# Patient Record
Sex: FEMALE | Race: WHITE | NOT HISPANIC OR LATINO | Employment: UNEMPLOYED | URBAN - METROPOLITAN AREA
[De-identification: names, ages, dates, MRNs, and addresses within clinical notes are randomized per-mention and may not be internally consistent; named-entity substitution may affect disease eponyms.]

---

## 2017-04-07 ENCOUNTER — HISTORICAL (OUTPATIENT)
Dept: HEPATOLOGY | Facility: HOSPITAL | Age: 67
End: 2017-04-07

## 2017-04-13 ENCOUNTER — HISTORICAL (OUTPATIENT)
Dept: ADMINISTRATIVE | Facility: HOSPITAL | Age: 67
End: 2017-04-13

## 2017-05-04 ENCOUNTER — HISTORICAL (OUTPATIENT)
Dept: LAB | Facility: HOSPITAL | Age: 67
End: 2017-05-04

## 2017-05-04 LAB
ABS NEUT (OLG): 3 X10(3)/MCL (ref 2.1–9.2)
ALBUMIN SERPL-MCNC: 3.8 GM/DL (ref 3.4–5)
ALBUMIN/GLOB SERPL: 1.1 {RATIO}
ALP SERPL-CCNC: 66 UNIT/L (ref 38–126)
ALT SERPL-CCNC: 15 UNIT/L (ref 12–78)
APTT PPP: 30 SECOND(S) (ref 20.6–36)
AST SERPL-CCNC: 17 UNIT/L (ref 15–37)
BASOPHILS # BLD AUTO: 0 X10(3)/MCL (ref 0–0.2)
BASOPHILS NFR BLD AUTO: 1 %
BILIRUB SERPL-MCNC: 0.5 MG/DL (ref 0.2–1)
BILIRUBIN DIRECT+TOT PNL SERPL-MCNC: 0.1 MG/DL (ref 0–0.2)
BILIRUBIN DIRECT+TOT PNL SERPL-MCNC: 0.4 MG/DL (ref 0–0.8)
BUN SERPL-MCNC: 17 MG/DL (ref 7–18)
CALCIUM SERPL-MCNC: 8.8 MG/DL (ref 8.5–10.1)
CHLORIDE SERPL-SCNC: 109 MMOL/L (ref 98–107)
CO2 SERPL-SCNC: 24 MMOL/L (ref 21–32)
CREAT SERPL-MCNC: 0.72 MG/DL (ref 0.55–1.02)
EOSINOPHIL # BLD AUTO: 0.1 X10(3)/MCL (ref 0–0.9)
EOSINOPHIL NFR BLD AUTO: 2 %
ERYTHROCYTE [DISTWIDTH] IN BLOOD BY AUTOMATED COUNT: 14.6 % (ref 11.5–17)
GLOBULIN SER-MCNC: 3.5 GM/DL (ref 2.4–3.5)
GLUCOSE SERPL-MCNC: 97 MG/DL (ref 74–106)
GROUP & RH: NORMAL
HCT VFR BLD AUTO: 42.9 % (ref 37–47)
HGB BLD-MCNC: 13.6 GM/DL (ref 12–16)
INR PPP: 0.96 (ref 0–1.27)
LYMPHOCYTES # BLD AUTO: 2 X10(3)/MCL (ref 0.6–4.6)
LYMPHOCYTES NFR BLD AUTO: 35 %
MAGNESIUM SERPL-MCNC: 2.2 MG/DL (ref 1.8–2.4)
MCH RBC QN AUTO: 28.7 PG (ref 27–31)
MCHC RBC AUTO-ENTMCNC: 31.7 GM/DL (ref 33–36)
MCV RBC AUTO: 90.5 FL (ref 80–94)
MONOCYTES # BLD AUTO: 0.5 X10(3)/MCL (ref 0.1–1.3)
MONOCYTES NFR BLD AUTO: 8 %
NEUTROPHILS # BLD AUTO: 3 X10(3)/MCL (ref 2.1–9.2)
NEUTROPHILS NFR BLD AUTO: 53 %
PHOSPHATE SERPL-MCNC: 3.1 MG/DL (ref 2.5–4.9)
PLATELET # BLD AUTO: 280 X10(3)/MCL (ref 130–400)
PMV BLD AUTO: 12.5 FL (ref 9.4–12.4)
POTASSIUM SERPL-SCNC: 4.6 MMOL/L (ref 3.5–5.1)
PROT SERPL-MCNC: 7.3 GM/DL (ref 6.4–8.2)
PROTHROMBIN TIME: 12.6 SECOND(S) (ref 12.1–14.2)
RBC # BLD AUTO: 4.74 X10(6)/MCL (ref 4.2–5.4)
SODIUM SERPL-SCNC: 142 MMOL/L (ref 136–145)
WBC # SPEC AUTO: 5.6 X10(3)/MCL (ref 4.5–11.5)

## 2017-06-08 ENCOUNTER — HISTORICAL (OUTPATIENT)
Dept: ADMINISTRATIVE | Facility: HOSPITAL | Age: 67
End: 2017-06-08

## 2017-06-08 LAB
APPEARANCE, UA: CLEAR
BACTERIA SPEC CULT: ABNORMAL /HPF
BILIRUB UR QL STRIP: NEGATIVE
COLOR UR: ABNORMAL
GLUCOSE (UA): NEGATIVE
HGB UR QL STRIP: ABNORMAL
KETONES UR QL STRIP: NEGATIVE
LEUKOCYTE ESTERASE UR QL STRIP: ABNORMAL
NITRITE UR QL STRIP: NEGATIVE
PH UR STRIP: 5 [PH] (ref 5–9)
PROT UR QL STRIP: NEGATIVE
RBC #/AREA URNS HPF: ABNORMAL /[HPF]
SP GR UR STRIP: 1.02 (ref 1–1.03)
SQUAMOUS EPITHELIAL, UA: ABNORMAL
UROBILINOGEN UR STRIP-ACNC: 0.2
WBC #/AREA URNS HPF: 22 /HPF (ref 0–3)

## 2017-09-12 ENCOUNTER — HISTORICAL (OUTPATIENT)
Dept: LAB | Facility: HOSPITAL | Age: 67
End: 2017-09-12

## 2017-09-12 LAB
ABS NEUT (OLG): 2.21
ALBUMIN SERPL-MCNC: 3.2 GM/DL (ref 3.4–5)
ALBUMIN/GLOB SERPL: 0.8 RATIO (ref 1.1–2)
ALP SERPL-CCNC: 75 UNIT/L (ref 50–136)
ALT SERPL-CCNC: 18 UNIT/L (ref 12–78)
AST SERPL-CCNC: 15 UNIT/L (ref 10–37)
BASOPHILS # BLD AUTO: 0.05 X10(3)/MCL
BASOPHILS NFR BLD AUTO: 1 %
BILIRUB SERPL-MCNC: 0.4 MG/DL (ref 0.2–1)
BILIRUBIN DIRECT+TOT PNL SERPL-MCNC: 0.06 MG/DL (ref 0.05–0.2)
BILIRUBIN DIRECT+TOT PNL SERPL-MCNC: 0.34 MG/DL
BUN SERPL-MCNC: 11 MG/DL (ref 7–18)
CALCIUM SERPL-MCNC: 8.7 MG/DL (ref 8.5–10.1)
CHLORIDE SERPL-SCNC: 107 MMOL/L (ref 98–107)
CHOLEST SERPL-MCNC: 232 MG/DL (ref 50–200)
CHOLEST/HDLC SERPL: 4 {RATIO} (ref 0–5)
CO2 SERPL-SCNC: 26.1 MMOL/L (ref 21–32)
CREAT SERPL-MCNC: 0.75 MG/DL (ref 0.55–1.02)
DEPRECATED CALCIDIOL+CALCIFEROL SERPL-MC: 40.38 NG/ML (ref 30–80)
EOSINOPHIL # BLD AUTO: 0.2 X10(3)/MCL
EOSINOPHIL NFR BLD AUTO: 4 %
ERYTHROCYTE [DISTWIDTH] IN BLOOD BY AUTOMATED COUNT: 15 %
GLOBULIN SER-MCNC: 3.8 GM/DL (ref 2.4–3.5)
GLUCOSE SERPL-MCNC: 105 MG/DL (ref 74–106)
HCT VFR BLD AUTO: 39.3 % (ref 34–46)
HDLC SERPL-MCNC: 58 MG/DL (ref 35–60)
HGB BLD-MCNC: 12.3 GM/DL (ref 11.3–15.4)
IMM GRANULOCYTES # BLD AUTO: 0.01 10*3/UL (ref 0–0.1)
IMM GRANULOCYTES NFR BLD AUTO: 0.2 % (ref 0–1)
LDLC SERPL CALC-MCNC: 145 MG/DL (ref 50–140)
LYMPHOCYTES # BLD AUTO: 2.12 X10(3)/MCL
LYMPHOCYTES NFR BLD AUTO: 41.9 %
MCH RBC QN AUTO: 28.7 PG (ref 27–33)
MCHC RBC AUTO-ENTMCNC: 31.3 GM/DL (ref 32–35)
MCV RBC AUTO: 91.6 FL (ref 81–97)
MONOCYTES # BLD AUTO: 0.47 X10(3)/MCL
MONOCYTES NFR BLD AUTO: 9.3 %
NEUTROPHILS # BLD AUTO: 2.21 X10(3)/MCL
NEUTROPHILS NFR BLD AUTO: 43.6 %
PLATELET # BLD AUTO: 323 X10(3)/MCL (ref 151–368)
PMV BLD AUTO: 11 FL
POTASSIUM SERPL-SCNC: 3.9 MMOL/L (ref 3.5–5.1)
PROT SERPL-MCNC: 7 GM/DL (ref 6.4–8.2)
RBC # BLD AUTO: 4.29 X10(6)/MCL (ref 3.9–5)
SODIUM SERPL-SCNC: 142 MMOL/L (ref 136–145)
TRIGL SERPL-MCNC: 144 MG/DL (ref 30–150)
TSH SERPL-ACNC: 4.38 MIU/ML (ref 0.35–3.75)
VLDLC SERPL CALC-MCNC: 29 MG/DL
WBC # SPEC AUTO: 5.06 X10(3)/MCL (ref 3.4–9.2)

## 2017-12-19 ENCOUNTER — HISTORICAL (OUTPATIENT)
Dept: LAB | Facility: HOSPITAL | Age: 67
End: 2017-12-19

## 2017-12-19 LAB — TSH SERPL-ACNC: 1.69 MIU/ML (ref 0.35–3.75)

## 2018-05-22 ENCOUNTER — HISTORICAL (OUTPATIENT)
Dept: RADIOLOGY | Facility: HOSPITAL | Age: 68
End: 2018-05-22

## 2019-10-04 ENCOUNTER — HISTORICAL (OUTPATIENT)
Dept: LAB | Facility: HOSPITAL | Age: 69
End: 2019-10-04

## 2019-10-04 LAB
ABS NEUT (OLG): 2.39
ALBUMIN SERPL-MCNC: 3.4 GM/DL (ref 3.4–5)
ALBUMIN/GLOB SERPL: 0.9 RATIO (ref 1.1–2)
ALP SERPL-CCNC: 71 UNIT/L (ref 46–116)
ALT SERPL-CCNC: 12 UNIT/L (ref 12–78)
AST SERPL-CCNC: 15 UNIT/L (ref 10–37)
BASOPHILS # BLD AUTO: 0.06 X10(3)/MCL
BASOPHILS NFR BLD AUTO: 1.2 %
BILIRUB SERPL-MCNC: 0.4 MG/DL (ref 0.2–1)
BILIRUBIN DIRECT+TOT PNL SERPL-MCNC: 0.09 MG/DL (ref 0–0.2)
BILIRUBIN DIRECT+TOT PNL SERPL-MCNC: 0.31 MG/DL
BUN SERPL-MCNC: 11 MG/DL (ref 7–18)
CALCIUM SERPL-MCNC: 8.8 MG/DL (ref 8.5–10.1)
CHLORIDE SERPL-SCNC: 107 MMOL/L (ref 98–107)
CHOLEST SERPL-MCNC: 260 MG/DL (ref 50–200)
CHOLEST/HDLC SERPL: 4 {RATIO} (ref 0–5)
CO2 SERPL-SCNC: 27.8 MMOL/L (ref 21–32)
CREAT SERPL-MCNC: 0.71 MG/DL (ref 0.55–1.02)
EOSINOPHIL # BLD AUTO: 0.17 X10(3)/MCL
EOSINOPHIL NFR BLD AUTO: 3.4 %
ERYTHROCYTE [DISTWIDTH] IN BLOOD BY AUTOMATED COUNT: 16 %
GLOBULIN SER-MCNC: 3.8 GM/DL (ref 2.4–3.5)
GLUCOSE SERPL-MCNC: 100 MG/DL (ref 74–106)
HCT VFR BLD AUTO: 42.4 % (ref 34–46)
HDLC SERPL-MCNC: 63 MG/DL (ref 35–60)
HGB BLD-MCNC: 13.4 GM/DL (ref 11.3–15.4)
IMM GRANULOCYTES # BLD AUTO: 0 10*3/UL (ref 0–0.1)
IMM GRANULOCYTES NFR BLD AUTO: 0 % (ref 0–1)
LDLC SERPL CALC-MCNC: 171 MG/DL (ref 50–140)
LYMPHOCYTES # BLD AUTO: 1.97 X10(3)/MCL
LYMPHOCYTES NFR BLD AUTO: 38.9 %
MCH RBC QN AUTO: 28.5 PG (ref 27–33)
MCHC RBC AUTO-ENTMCNC: 31.6 GM/DL (ref 32–35)
MCV RBC AUTO: 90 FL (ref 81–97)
MONOCYTES # BLD AUTO: 0.48 X10(3)/MCL
MONOCYTES NFR BLD AUTO: 9.5 %
NEUTROPHILS # BLD AUTO: 2.39 X10(3)/MCL
NEUTROPHILS NFR BLD AUTO: 47 %
PLATELET # BLD AUTO: 279 X10(3)/MCL (ref 140–450)
PMV BLD AUTO: 11 FL
POTASSIUM SERPL-SCNC: 4.2 MMOL/L (ref 3.5–5.1)
PROT SERPL-MCNC: 7.2 GM/DL (ref 6.4–8.2)
RBC # BLD AUTO: 4.71 X10(6)/MCL (ref 3.9–5)
SODIUM SERPL-SCNC: 140 MMOL/L (ref 136–145)
TRIGL SERPL-MCNC: 131 MG/DL (ref 30–150)
TSH SERPL-ACNC: 2.03 MIU/ML (ref 0.35–3.75)
VLDLC SERPL CALC-MCNC: 26 MG/DL
WBC # SPEC AUTO: 5.07 X10(3)/MCL (ref 3.4–9.2)

## 2019-10-07 ENCOUNTER — HISTORICAL (OUTPATIENT)
Dept: RADIOLOGY | Facility: HOSPITAL | Age: 69
End: 2019-10-07

## 2020-10-06 ENCOUNTER — HISTORICAL (OUTPATIENT)
Dept: LAB | Facility: HOSPITAL | Age: 70
End: 2020-10-06

## 2020-10-06 LAB
ABS NEUT (OLG): 2.72
ALBUMIN SERPL-MCNC: 3.7 GM/DL (ref 3.4–4.8)
ALBUMIN/GLOB SERPL: 1.1 RATIO (ref 1.1–2)
ALP SERPL-CCNC: 77 UNIT/L (ref 40–150)
ALT SERPL-CCNC: 15 UNIT/L (ref 0–55)
AST SERPL-CCNC: 17 UNIT/L (ref 5–34)
BASOPHILS # BLD AUTO: 0.04 X10(3)/MCL
BASOPHILS NFR BLD AUTO: 0.7 %
BILIRUB SERPL-MCNC: 0.6 MG/DL
BILIRUBIN DIRECT+TOT PNL SERPL-MCNC: 0.2 MG/DL (ref 0–0.5)
BILIRUBIN DIRECT+TOT PNL SERPL-MCNC: 0.4 MG/DL
BUN SERPL-MCNC: 15 MG/DL (ref 9.8–20.1)
CALCIUM SERPL-MCNC: 8.9 MG/DL (ref 8.4–10.2)
CHLORIDE SERPL-SCNC: 110 MMOL/L (ref 98–107)
CHOLEST SERPL-MCNC: 168 MG/DL
CHOLEST/HDLC SERPL: 3 {RATIO} (ref 0–5)
CO2 SERPL-SCNC: 25 MEQ/L (ref 23–31)
CREAT SERPL-MCNC: 0.78 MG/DL (ref 0.55–1.02)
DEPRECATED CALCIDIOL+CALCIFEROL SERPL-MC: 37.7 NG/ML (ref 6.6–49.9)
EOSINOPHIL # BLD AUTO: 0.16 X10(3)/MCL
EOSINOPHIL NFR BLD AUTO: 2.9 %
ERYTHROCYTE [DISTWIDTH] IN BLOOD BY AUTOMATED COUNT: 16 %
GLOBULIN SER-MCNC: 3.5 GM/DL (ref 2.4–3.5)
GLUCOSE SERPL-MCNC: 111 MG/DL (ref 82–115)
HCT VFR BLD AUTO: 43.6 % (ref 34–46)
HDLC SERPL-MCNC: 57 MG/DL (ref 35–60)
HGB BLD-MCNC: 13.1 GM/DL (ref 11.3–15.4)
IMM GRANULOCYTES # BLD AUTO: 0 10*3/UL (ref 0–0.1)
IMM GRANULOCYTES NFR BLD AUTO: 0 % (ref 0–1)
LDLC SERPL CALC-MCNC: 90 MG/DL (ref 50–140)
LYMPHOCYTES # BLD AUTO: 2.04 X10(3)/MCL
LYMPHOCYTES NFR BLD AUTO: 36.8 %
MCH RBC QN AUTO: 27.9 PG (ref 27–33)
MCHC RBC AUTO-ENTMCNC: 30 GM/DL (ref 32–35)
MCV RBC AUTO: 93 FL (ref 81–97)
MONOCYTES # BLD AUTO: 0.58 X10(3)/MCL
MONOCYTES NFR BLD AUTO: 10.5 %
NEUTROPHILS # BLD AUTO: 2.72 X10(3)/MCL
NEUTROPHILS NFR BLD AUTO: 49.1 %
PLATELET # BLD AUTO: 274 X10(3)/MCL (ref 140–450)
PMV BLD AUTO: 11 FL
POTASSIUM SERPL-SCNC: 4.3 MMOL/L (ref 3.5–5.1)
PROT SERPL-MCNC: 7.2 GM/DL (ref 5.8–7.6)
RBC # BLD AUTO: 4.69 X10(6)/MCL (ref 3.9–5)
SODIUM SERPL-SCNC: 143 MMOL/L (ref 136–145)
TRIGL SERPL-MCNC: 106 MG/DL (ref 37–140)
TSH SERPL-ACNC: 1.6 UIU/ML (ref 0.35–4.94)
VLDLC SERPL CALC-MCNC: 21 MG/DL
WBC # SPEC AUTO: 5.54 X10(3)/MCL (ref 3.4–9.2)

## 2020-10-14 ENCOUNTER — HISTORICAL (OUTPATIENT)
Dept: RADIOLOGY | Facility: HOSPITAL | Age: 70
End: 2020-10-14

## 2021-11-03 ENCOUNTER — HISTORICAL (OUTPATIENT)
Dept: RADIOLOGY | Facility: HOSPITAL | Age: 71
End: 2021-11-03

## 2021-11-03 LAB
ABS NEUT (OLG): 3.48
ALBUMIN SERPL-MCNC: 3.6 GM/DL (ref 3.4–4.8)
ALBUMIN/GLOB SERPL: 1.1 RATIO (ref 1.1–2)
ALP SERPL-CCNC: 74 UNIT/L (ref 40–150)
ALT SERPL-CCNC: 15 UNIT/L (ref 0–55)
AST SERPL-CCNC: 17 UNIT/L (ref 5–34)
BASOPHILS # BLD AUTO: 0.06 X10(3)/MCL
BASOPHILS NFR BLD AUTO: 0.9 %
BILIRUB SERPL-MCNC: 0.6 MG/DL
BILIRUBIN DIRECT+TOT PNL SERPL-MCNC: 0.2 MG/DL (ref 0–0.5)
BILIRUBIN DIRECT+TOT PNL SERPL-MCNC: 0.4 MG/DL
BUN SERPL-MCNC: 13 MG/DL (ref 9.8–20.1)
CALCIUM SERPL-MCNC: 9.3 MG/DL (ref 8.7–10.5)
CHLORIDE SERPL-SCNC: 110 MMOL/L (ref 98–107)
CHOLEST SERPL-MCNC: 163 MG/DL
CHOLEST/HDLC SERPL: 3 {RATIO} (ref 0–5)
CO2 SERPL-SCNC: 26 MEQ/L (ref 23–31)
CREAT SERPL-MCNC: 0.65 MG/DL (ref 0.55–1.02)
EOSINOPHIL # BLD AUTO: 0.26 X10(3)/MCL
EOSINOPHIL NFR BLD AUTO: 3.9 %
ERYTHROCYTE [DISTWIDTH] IN BLOOD BY AUTOMATED COUNT: 16 %
GLOBULIN SER-MCNC: 3.3 GM/DL (ref 2.4–3.5)
GLUCOSE SERPL-MCNC: 114 MG/DL (ref 82–115)
HCT VFR BLD AUTO: 42.4 % (ref 34–46)
HDLC SERPL-MCNC: 50 MG/DL (ref 35–60)
HGB BLD-MCNC: 12.9 GM/DL (ref 11.3–15.4)
IMM GRANULOCYTES # BLD AUTO: 0.03 10*3/UL (ref 0–0.1)
IMM GRANULOCYTES NFR BLD AUTO: 0.5 % (ref 0–1)
LDLC SERPL CALC-MCNC: 85 MG/DL (ref 50–140)
LYMPHOCYTES # BLD AUTO: 2.18 X10(3)/MCL
LYMPHOCYTES NFR BLD AUTO: 32.7 %
MCH RBC QN AUTO: 28.9 PG (ref 27–33)
MCHC RBC AUTO-ENTMCNC: 30.4 GM/DL (ref 32–35)
MCV RBC AUTO: 94.9 FL (ref 81–97)
MONOCYTES # BLD AUTO: 0.65 X10(3)/MCL
MONOCYTES NFR BLD AUTO: 9.8 %
NEUTROPHILS # BLD AUTO: 3.48 X10(3)/MCL
NEUTROPHILS NFR BLD AUTO: 52.2 %
PLATELET # BLD AUTO: 264 X10(3)/MCL (ref 140–450)
PMV BLD AUTO: 12 FL
POTASSIUM SERPL-SCNC: 4.3 MMOL/L (ref 3.5–5.1)
PROT SERPL-MCNC: 6.9 GM/DL (ref 5.8–7.6)
RBC # BLD AUTO: 4.47 X10(6)/MCL (ref 3.9–5)
SODIUM SERPL-SCNC: 144 MMOL/L (ref 136–145)
TRIGL SERPL-MCNC: 141 MG/DL (ref 37–140)
TSH SERPL-ACNC: 2.21 UIU/ML (ref 0.35–4.94)
VLDLC SERPL CALC-MCNC: 28 MG/DL
WBC # SPEC AUTO: 6.66 X10(3)/MCL (ref 3.4–9.2)

## 2021-12-28 LAB — SARS-COV-2 AG RESP QL IA.RAPID: NEGATIVE

## 2021-12-29 ENCOUNTER — HISTORICAL (OUTPATIENT)
Dept: ADMINISTRATIVE | Facility: HOSPITAL | Age: 71
End: 2021-12-29

## 2021-12-29 LAB
ABS NEUT (OLG): 1.89
ALBUMIN SERPL-MCNC: 2.8 GM/DL (ref 3.4–4.8)
ALBUMIN/GLOB SERPL: 0.9 RATIO (ref 1.1–2)
ALP SERPL-CCNC: 59 UNIT/L (ref 40–150)
ALT SERPL-CCNC: 16 UNIT/L (ref 0–55)
AST SERPL-CCNC: 15 UNIT/L (ref 5–34)
BASOPHILS # BLD AUTO: 0.04 X10(3)/MCL
BASOPHILS NFR BLD AUTO: 1 %
BILIRUB SERPL-MCNC: 0.6 MG/DL
BILIRUBIN DIRECT+TOT PNL SERPL-MCNC: 0.2 MG/DL (ref 0–0.5)
BILIRUBIN DIRECT+TOT PNL SERPL-MCNC: 0.4 MG/DL
BNP BLD-MCNC: 12.5 PG/ML
BUN SERPL-MCNC: 11 MG/DL (ref 9.8–20.1)
CALCIUM SERPL-MCNC: 8.7 MG/DL (ref 8.7–10.5)
CHLORIDE SERPL-SCNC: 111 MMOL/L (ref 98–107)
CO2 SERPL-SCNC: 27 MEQ/L (ref 23–31)
CREAT SERPL-MCNC: 0.48 MG/DL (ref 0.55–1.02)
EOSINOPHIL # BLD AUTO: 0.29 X10(3)/MCL
EOSINOPHIL NFR BLD AUTO: 7.1 %
ERYTHROCYTE [DISTWIDTH] IN BLOOD BY AUTOMATED COUNT: 18 %
EST. AVERAGE GLUCOSE BLD GHB EST-MCNC: 114 MG/DL
FERRITIN SERPL-MCNC: 128.07 NG/ML (ref 4.63–204)
GLOBULIN SER-MCNC: 3 GM/DL (ref 2.4–3.5)
GLUCOSE SERPL-MCNC: 99 MG/DL (ref 82–115)
HBA1C MFR BLD: 5.6 % (ref 4–6)
HCT VFR BLD AUTO: 34.6 % (ref 34–46)
HGB BLD-MCNC: 10.3 GM/DL (ref 11.3–15.4)
IMM GRANULOCYTES # BLD AUTO: 0.03 10*3/UL (ref 0–0.1)
IMM GRANULOCYTES NFR BLD AUTO: 0.7 % (ref 0–1)
IRON SATN MFR SERPL: 35 % (ref 20–50)
IRON SERPL-MCNC: 76 UG/DL (ref 50–170)
LYMPHOCYTES # BLD AUTO: 1.23 X10(3)/MCL
LYMPHOCYTES NFR BLD AUTO: 30.1 %
MAGNESIUM SERPL-MCNC: 1.9 MG/DL (ref 1.6–2.6)
MCH RBC QN AUTO: 29.1 PG (ref 27–33)
MCHC RBC AUTO-ENTMCNC: 29.8 GM/DL (ref 32–35)
MCV RBC AUTO: 97.7 FL (ref 81–97)
MONOCYTES # BLD AUTO: 0.6 X10(3)/MCL
MONOCYTES NFR BLD AUTO: 14.7 %
NEUTROPHILS # BLD AUTO: 1.89 X10(3)/MCL
NEUTROPHILS NFR BLD AUTO: 46.4 %
PHOSPHATE SERPL-MCNC: 3.2 MG/DL (ref 2.3–4.7)
PLATELET # BLD AUTO: 196 X10(3)/MCL (ref 140–450)
PMV BLD AUTO: 11 FL
POTASSIUM SERPL-SCNC: 3.5 MMOL/L (ref 3.5–5.1)
PROT SERPL-MCNC: 5.8 GM/DL (ref 5.8–7.6)
RBC # BLD AUTO: 3.54 X10(6)/MCL (ref 3.9–5)
SODIUM SERPL-SCNC: 144 MMOL/L (ref 136–145)
TIBC SERPL-MCNC: 140 UG/DL (ref 70–310)
TIBC SERPL-MCNC: 216 UG/DL (ref 250–450)
TRANSFERRIN SERPL-MCNC: 183 MG/DL (ref 173–360)
TSH SERPL-ACNC: 2.6 UIU/ML (ref 0.35–4.94)
WBC # SPEC AUTO: 4.08 X10(3)/MCL (ref 3.4–9.2)

## 2022-01-08 LAB
ABS NEUT (OLG): 2.78
ALBUMIN SERPL-MCNC: 2.9 GM/DL (ref 3.4–4.8)
ALBUMIN/GLOB SERPL: 1 RATIO (ref 1.1–2)
ALP SERPL-CCNC: 59 UNIT/L (ref 40–150)
ALT SERPL-CCNC: 14 UNIT/L (ref 0–55)
AST SERPL-CCNC: 15 UNIT/L (ref 5–34)
BASOPHILS # BLD AUTO: 0.03 X10(3)/MCL
BASOPHILS NFR BLD AUTO: 0.6 %
BILIRUB SERPL-MCNC: 0.4 MG/DL
BILIRUBIN DIRECT+TOT PNL SERPL-MCNC: 0.2 MG/DL
BILIRUBIN DIRECT+TOT PNL SERPL-MCNC: 0.2 MG/DL (ref 0–0.5)
BUN SERPL-MCNC: 9 MG/DL (ref 9.8–20.1)
CALCIUM SERPL-MCNC: 9.1 MG/DL (ref 8.7–10.5)
CHLORIDE SERPL-SCNC: 113 MMOL/L (ref 98–107)
CO2 SERPL-SCNC: 25 MEQ/L (ref 23–31)
CREAT SERPL-MCNC: 0.54 MG/DL (ref 0.55–1.02)
EOSINOPHIL # BLD AUTO: 0.21 X10(3)/MCL
EOSINOPHIL NFR BLD AUTO: 4.2 %
ERYTHROCYTE [DISTWIDTH] IN BLOOD BY AUTOMATED COUNT: 17 %
GLOBULIN SER-MCNC: 2.8 GM/DL (ref 2.4–3.5)
GLUCOSE SERPL-MCNC: 109 MG/DL (ref 82–115)
HCT VFR BLD AUTO: 36 % (ref 34–46)
HGB BLD-MCNC: 10.7 GM/DL (ref 11.3–15.4)
IMM GRANULOCYTES # BLD AUTO: 0.02 10*3/UL (ref 0–0.1)
IMM GRANULOCYTES NFR BLD AUTO: 0.4 % (ref 0–1)
LYMPHOCYTES # BLD AUTO: 1.3 X10(3)/MCL
LYMPHOCYTES NFR BLD AUTO: 26 %
MCH RBC QN AUTO: 29.4 PG (ref 27–33)
MCHC RBC AUTO-ENTMCNC: 29.7 GM/DL (ref 32–35)
MCV RBC AUTO: 98.9 FL (ref 81–97)
MONOCYTES # BLD AUTO: 0.66 X10(3)/MCL
MONOCYTES NFR BLD AUTO: 13.2 %
NEUTROPHILS # BLD AUTO: 2.78 X10(3)/MCL
NEUTROPHILS NFR BLD AUTO: 55.6 %
PLATELET # BLD AUTO: 310 X10(3)/MCL (ref 140–450)
PMV BLD AUTO: 11 FL
POTASSIUM SERPL-SCNC: 4.1 MMOL/L (ref 3.5–5.1)
PROT SERPL-MCNC: 5.7 GM/DL (ref 5.8–7.6)
RBC # BLD AUTO: 3.64 X10(6)/MCL (ref 3.9–5)
SODIUM SERPL-SCNC: 145 MMOL/L (ref 136–145)
WBC # SPEC AUTO: 5 X10(3)/MCL (ref 3.4–9.2)

## 2022-03-24 ENCOUNTER — HISTORICAL (OUTPATIENT)
Dept: RESPIRATORY THERAPY | Facility: HOSPITAL | Age: 72
End: 2022-03-24

## 2022-04-25 ENCOUNTER — HISTORICAL (OUTPATIENT)
Dept: LAB | Facility: HOSPITAL | Age: 72
End: 2022-04-25
Payer: MEDICARE

## 2022-04-25 LAB
CHOLEST SERPL-MCNC: 170 MG/DL
CHOLEST/HDLC SERPL: 3 {RATIO} (ref 0–5)
EST. AVERAGE GLUCOSE BLD GHB EST-MCNC: 120 MG/DL
HBA1C MFR BLD: 5.8 % (ref 4–6)
HDLC SERPL-MCNC: 53 MG/DL (ref 35–60)
LDLC SERPL CALC-MCNC: 99 MG/DL (ref 50–140)
T4 FREE SERPL-MCNC: 1.04 NG/DL (ref 0.7–1.48)
TRIGL SERPL-MCNC: 90 MG/DL (ref 37–140)
TSH SERPL-ACNC: 1.73 M[IU]/L (ref 0.35–4.94)
VLDLC SERPL CALC-MCNC: 18 MG/DL

## 2022-04-30 NOTE — PROGRESS NOTES
"   Patient:   Estephania Herrera             MRN: [id number]            FIN: [id number]               Age:   67 years     Sex:  Female     :  1950   Associated Diagnoses:   None   Author:   Nelly Knox      Chief Complaint   1 week post operative appointment; S/P LELA/BSO on 17 for uterine fibroids.   2017 10:29 CDT       "removal of staples-follow up to surgery"        Interval History   Estephania Herrera is a 66yo WF  with LMP over 10 years ago not currently on HRT; PMH of Hypothyroidism, Obesity. PSH of Hysteroscopy/D&C (17),  x 2, Ankle surgery. Mrs. Herrera presented to local gynecologist Dr. Serena Mathew 17 with complaints of post-menopausal bleeding since 17. Pelvic US 17 revealed a uterus measuring 4.5 x 3.5 x 6.9 cm with a large mass noted in the endometrial cavity measuring 3.4 x 3.5 cm with demonstration of vascularity; adjacent fluid with debris was also noted within the endometrial cavity. The ovaries were not visualized. No free fluid was noted in the pelvis. Hysteroscopy/D&C was performed on 17 with very little endometrial tissue obtained. Pathology revealed predominately blood admixed with superficial strips of benign endometrial glandular epithelium, endocervical/squamous mucosa, focal stroma and scant benign endometrium with pathologist recommending additional tissue biopsies; Ms Herrera presented 3/30/17 for initial consultation with her , Hay Herrera, at the request of Dr. Serena Mathew. Exam under Anesthesia,Hysteroscopy/D&C 17 for endometrial mass and postmenopausal bleeding. Final surgical pathology with no obvious evidence of malignancy. However, very scant amount of endometrial tissue was noted in specimen making evaluation difficult.  S/P successful LELA/BSO on 17 with surgical pathology negative for malignancy; leiomyomata and chronic cervicitis with simple hyperplasia without atypia.           Health " Status   Allergies:    Allergic Reactions (All)  No Known Medication Allergies,    Allergies (1) Active Reaction  No Known Medication Allergies None Documented     Current medications:  (Selected)   Documented Medications  Documented  Multivitamin Liquid - Adult: 0 Refill(s)  Norco 5 mg-325 mg oral tablet: 2 tab(s), Oral, q4hr, PRN PRN pain, moderate, 0 Refill(s)  Omega Essentials: 0 Refill(s)  acetaminophen-hydrocodone 325 mg-5 mg oral tablet: 1 tab(s), Oral, q4hr, PRN PRN pain, mild, 0 Refill(s)  cholecalciferol 5000 intl units oral capsule: 5,000 IntUnit = 1 cap(s), Oral, Daily, with food, # 5,000 cap(s), 0 Refill(s)  levothyroxine 50 mcg (0.05 mg) oral tablet: 50 mcg = 1 tab(s), Oral, Daily, # 90 tab(s), 0 Refill(s)   Problem list:    All Problems  Endometrial mass / SNOMED CT 4263461398 / Confirmed  Morbid obesity / SNOMED CT 496792093 / Probable  Sciatica / SNOMED CT [id number] / Confirmed  Resolved: Chronic back pain / SNOMED CT P4H55X3W-9A69-52N6-[id number]-6M581MHAHX86  Resolved: Environmental allergies / SNOMED CT FDEP3V3F-[id number]  Resolved: Hypothyroid / SNOMED CT 577683749  Resolved: Obesity / SNOMED CT 9507011836  Resolved: Osteoporosis / SNOMED CT 493824758  Resolved: Vitamin deficiency / SNOMED CT Y5W9FFL4-2474-3C3I-SPWE-YEJ7112QH910,    Active Problems (3)  Endometrial mass   Morbid obesity   Sciatica         Histories   Past Medical History:    Resolved  Obesity (5804348153):  Resolved.  Hypothyroid (334190151):  Resolved.  Osteoporosis (795094425):  Resolved.  Chronic back pain (T4M08J0I-1Y73-76L5-KX6P-4Z366FHZUE60):  Resolved.  Environmental allergies (LKIX7L0L-32S2-6M06-4O65-18KH08Z7R903):  Resolved.  Vitamin deficiency (T0H5VTD5-2939-2W9G-GETC-AYC8780SB268):  Resolved.   Procedure history:    Hysterectomy Total Abdominal w/ BSO (.) on 5/30/2017 at 67 Years.  Comments:  5/30/2017 12:13 - Sunil SUMNER, Ora ALVARADO  auto-populated from documented surgical  case  Exploration Laparotomy (.) on 2017 at 67 Years.  Comments:  2017 12:13 - Sunil SUMNER, Ora ALVARADO  auto-populated from documented surgical case  Hysteroscopy (.) on 2017 at 67 Years.  Comments:  2017 08:01 - Alia Fishman RN  auto-populated from documented surgical case   delivery ([id number]).  Ankle ([id number]).  Dilation and curettage (70394213).  TL - Tubal ligation (837822534).      Gynecologic history   Pregnancy status:  2, para 2.   Pap test: 1 months ago.   Mammogram: last 1.   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        Review of Systems   Integumentary:  Negative.    Neurologic:  Negative.    Psychiatric:  Negative.    Endocrine:  Negative.    Genitourinary:  Dysuria.    Hematology/Lymphatics:  Negative.    Immunologic:  Negative.    Constitutional:  Negative.    Eye:  Negative.    Ear/Nose/Mouth/Throat:  Negative.    Cardiovascular:  Negative.    Respiratory:  Negative.    Gastrointestinal:  Negative.    Musculoskeletal:  Negative.    Breast:  Negative.    All other systems are negative      Physical Examination   Chaperone present:  During the entire physical exam, Lainey Pastor MA.    Vital Signs   2017 10:29 CDT       Temperature Oral          35.8 DegC  LOW                             Peripheral Pulse Rate     70 bpm                             Systolic Blood Pressure   132 mmHg                             Diastolic Blood Pressure  73 mmHg     Measurements from flowsheet : Measurements   2017 10:29 CDT       Weight Dosing             111.7 kg                             Weight Measured            111.7 kg                             Weight Measured and Calculated in Lbs     246.25 lb                             Height/Length Dosing      164 cm                             Height/Length Measured    164 cm                             BSA Measured              2.26 m2                             Body Mass Index Measured  41.53 kg/m2     General:  Alert and oriented, No acute distress.    Eye:  Pupils are equal, round and reactive to light, Extraocular movements are intact, Normal conjunctiva, Vision unchanged.    HENT:  Normocephalic, Normal hearing, Oral mucosa is moist.    Neck:  Supple, Non-tender, No jugular venous distention, No lymphadenopathy.    Respiratory:  Lungs are clear to auscultation, Respirations are non-labored, Breath sounds are equal, Symmetrical chest wall expansion, No chest wall tenderness.    Cardiovascular:  Normal rate, Regular rhythm, No murmur, No gallop, Good pulses equal in all extremities, Normal peripheral perfusion, No edema.    Breast:  Deferred.    Gastrointestinal:  Soft, Non-tender, Non-distended, Normal bowel sounds, No organomegaly, Morbid obese.    Genitourinary:  Deferred pelvic exam today .    Lymphatics:  No lymphadenopathy neck, axilla, groin.    Musculoskeletal:  Normal range of motion, Normal strength, No tenderness, No swelling.    Integumentary:  Warm, Dry, Intact, No pallor, No rash, Midline abdominal abdominal incision with staples open to air with good edge approximation.  Mild bruising noted to the lateral aspects of wound without swelling, erythema or active drainage.  Skin staples were removed and continued good edge approximation.  Wound was cleaned with 1/2 strength hydrogen peroxide, Steri-Strips and benzoin were applied by Lainey Pastor MA..    Neurologic:  Alert, Oriented, Normal sensory, Normal motor function, No focal deficits, Cranial Nerves II-XII are grossly intact.    Cognition and Speech:  Oriented, Speech clear and coherent.    Psychiatric:   "Cooperative, Appropriate mood & affect, Normal judgment, Non-suicidal.       Review / Management   Final Diagnosis  UTERUS, FALLOPIAN TUBES AND OVARIES, HYSTERECTOMY WITH BILATERAL SALPINGO-OOPHORECTOMY:    A)  CHRONIC CERVICITIS.    B)  SIMPLE (CYSTIC) HYPERPLASIA WITHOUT ATYPIA.    C)  ADENOMYOSIS AND ADENOMYOMATA.    D)  LEIOMYOMATA, UP TO 3.5 CM.    E)  SEROSAL FIBROUS ADHESIONS AND CORPORA ALBICANTIA, RIGHT OVARY.     F)  PARATUBAL CYSTS, RIGHT FALLOPIAN TUBE.    G)  CORPORA ALBICANTIA, LEFT OVARY.    H)  HYDROSALPINX, LEFT FALLOPIAN TUBE.    CODE 8    *Electronically Signed*     Rory Correa MD, PHD  Verified: 05/31/17 12:29      Specimen Description  Uterus, cervix, bilateral tubes and bilateral ovaries - FS    Clinical Information       Pre-Operative Diagnosis:     Postmenopausal bleeding    Frozen Section Diagnosis  A.  SIMPLE (CYSTIC HYPERPLASIA) WITHOUT ATYPIA.  B.  LEIOMYOMATA (UP TO 3.5 CM), ADENOMYOSIS.  /PB  SUMMARY:  1 1ST FROZEN, 1 ADDITIONAL      Consult Performed By:  PB/BAL    Gross Description  Received fresh for frozen section labeled on the container with the patient's name "Estephania Herrera, uterus, cervix, bilateral tubes and bilateral ovaries, [id number]". Received in formalin is a uterus with attached bilateral adnexa. The uterus weighs 80 grams and measures 8.5 cm superior to inferior x 4.4  cm cornu to cornu x 4.0 cm anterior to posterior. The serosa is mottled and tan pink. The anterior lower uterine segment and exocervix are inked blue and yellow. The exocervix is smooth, tan pink, glistening and measures 2.5 cm in diameter. The os is centrally located and measures 0.7 cm in greatest dimension. The uterus is bivalved revealing a well demarcated squamocolumnar junction.In the lower uterine segment is a rounded gray white rubbery nodule measuring 3.5 x 3.5 x 3.0 cm. The nodule distorts the endocervical canal. The nodule has a gray white whorled cut surface free of hemorrhage, degeneration " "and calcification.     The endometrial cavity measures 2.5 cm superior to inferior x 1.0 cm left to right. The endometrium is glistening, tan pink and measures 0.3 cm in thickness. There are no endometrial lesions identified. The myometrium is trabeculated, tan pink and measures 1.9 cm in length. The myometrium contains numerous rounded gray white rubbery nodules measuring up to 1.0 cm. The nodules have a gray white whorled cut surface free of hemorrhage, degeneration and calcification.    There is no attached parametrial soft tissue present.       Each adnexa weighs 7 grams. The right fallopian tube is dilated and measures 4.0 cm in length x 0.8 cm in diameter. The fallopian tube has a dusky tan pink serosa, fimbriated end and a lumen which is filled with thin clear fluid. The fallopian tube appears to have previously ligated and has two blunt ends. The tan cerebriform ovary measures 2.7 x 1.5 x 1.5  cm. The ovary has numerous adhesions. Serially sectioning the right ovary reveals a tan pink cut surface with numerous corpora albicantia.     The left fallopian tube measures 2.5 cm in length x 0.7 cm in greatest cross sectional dimension. The fallopian tube has a dusky tan pink serosa, dilated lumen and an attached fimbriated end. The fallopian tube appears to have previously ligated. The tan cerebriform ovary measures 2.7 x 1.6 x 1.5 cm. The ovary is serially sectioned revealing a dense tan pink cut surfaces with numerous corpora albicantia.  Representative sections are submitted in 16 cassettes labeled "[id number]" as follows:  1A, frozen section control of endomyometrium.  1B, frozen section control of lower uterine segment nodule.  1C, longitudinal section of anterior cervix and lower uterine segment.  1D, longitudinal section of posterior cervix and lower uterine segment.  1E, sections of lower uterine segment nodule.   1F-1H, anterior endomyometrium, myometrial masses and serosa.   1-I-1L, posterior " "endomyometrium, myometrial masses and serosa.  1M, 1N,  right adnexa.  1-O, 1P, left adnexa.  Cassette key:  X-[id number].      RLM/BAL    Microscopic Description  Microscopic examination performed.  Please see diagnosis.  Final Diagnosis  ENDOMETRIUM, CURETTAGE:    SMOOTH MUSCLE MYOMETRIAL TISSUE WITH FOCAL PIGMENT-LADEN MACROPHAGE INFILTRATION AND EXTREMELY SCANT SUPERFICIAL, INACTIVE ENDOMETRIAL TISSUE, ENDOCERVICAL GLANDULAR EPITHELIUM, MUCIN AND BLOOD, INSUFFICIENT FOR EVALUATION.    COMMENT:   Mild acute and chronic inflammation also noted within scant endomyometrial tissue which is predominantly myometrial. Pigment-laden macrophages may reflect previous biopsy or hemorrhagic sequelae.  The entire specimen has been examined at multiple levels of sectioning throughout submitted tissue. Insufficient endometrial tissue is present for diagnostic evaluation. Clinicopathologic correlation recommended.    CODE 2      *Electronically Signed*     Edgardo Sanders MD  Verified: 04/18/17 18:46      Specimen Description  Endometrial Curettings    Clinical Information       Pre-Operative Diagnosis:     endometrial cancer    Gross Description  Labeled on the container with the patient's name "Estephania Herrera".   The specimen is received in formalin labeled "endometrial curettings" and consists of a surgical Telfa pad containing multiple pieces of tan pink tissue and mucoid material measuring 1.5x1.5x0.3 cm in aggregate.  The specimen is placed in a specimen filter bag and submitted in its entirety in a single cassette [id number]    RLM/[place]    Microscopic Description  Microscopic examination performed.  Please see diagnosis.  * Final Report *    Reason For Exam  PMB, Endometrial Mass/fibroid, Initial Staging;Other (please specify)    Radiology Report  HISTORY:   Postmenopausal bleeding. Endometrial mass.     COMPARISON:  No priors.     TECHNIQUE:   Multiplanar multisequence MRI of the pelvis performed without and " with  gadolinium based IV contrast according to a female pelvis protocol.  Exam performed at 3.0 Susan.     FINDINGS:     Uterus: Uterus is anteverted measuring 9.5 x 4.9 x 3.5 cm. In the  upper uterine segment the endometrium is mildly thickened for  patient's age, measures 8 mm maximally. Centered in the lower uterine  segment there is a well-circumscribed heterogeneous mass. This  measures 3.7 x 3.4 x 3.2 cm. Brisk fairly homogeneous enhancement  after gadolinium. No disruption of the uterine serosa.     Adnexa: Small ovaries without suspicious adnexal lesion seen.     Urinary tract: Bladder is not well distended. Normal-appearing  urethra.     GI tract: Colonic diverticulosis.     Lymph node: No adenopathy. Symmetric inguinal lymph nodes.     Vasculature: Normal.     Bones/soft tissues: No focal marrow signal abnormality.     No free fluid.     IMPRESSION:     1. Lower uterine segment 3.7 cm mass, appearance most compatible with  submucosal leiomyoma. This could certainly be a source of bleeding.  2. The endometrium in the upper uterine segment is mildly thickened  for a postmenopausal patient, measures up to 8 mm.       Signature Line  Electronically Signed By: Nilesh Sheffield MD  Date/Time Signed: 2017 14:52    Technical Comments  Does patient have a pacemaker? No   Did Patient have reaction? No   Home Medication Reviewed? Yes        This document has an image    Result type: MRI Pelvis W W/O Contrast  Result date: 2017 11:36 CDT  Result status: Auth (Verified)  Result title: MRI Pelvis W W/O Contrast  Performed by: Nilesh Sheffield MD on 2017 14:39 CDT  Verified by: Nilesh Sheffield MD on 2017 14:52 CDT  Encounter info: [id number], [facility], Outpatient, 2017 - 2017           Impression and Plan   Estephania Herrera is a 66yo WF  with LMP over 10 years ago not currently on HRT; PMH of Hypothyroidism, Obesity. PSH of Hysteroscopy/D&C (17),   x 2, Ankle surgery. Mrs. Herrera presented to local gynecologist Dr. Serena Mathew 17 with complaints of post-menopausal bleeding since 17. Pelvic US 17 revealed a uterus measuring 4.5 x 3.5 x 6.9 cm with a large mass noted in the endometrial cavity measuring 3.4 x 3.5 cm with demonstration of vascularity; adjacent fluid with debris was also noted within the endometrial cavity. The ovaries were not visualized. No free fluid was noted in the pelvis. Hysteroscopy/D&C was performed on 17 with very little endometrial tissue obtained. Pathology revealed predominately blood admixed with superficial strips of benign endometrial glandular epithelium, endocervical/squamous mucosa, focal stroma and scant benign endometrium with pathologist recommending additional tissue biopsies. MRI Pelvis (17):Uterus is anteverted measuring 9.5 x 4.9 x 3.5 cm. In the upper uterine segment the endometrium is mildly thickened for patient's age, measures 8 mm maximally. Centered in the lower uterine segment there is a well-circumscribed heterogeneous mass. This measures 3.7 x 3.4 x 3.2 cm. Brisk fairly homogeneous enhancement after gadolinium. No disruption of the uterine serosa. Exam under Anesthesia, Hysteroscopy/D&C 17 for endometrial mass and postmenopausal bleeding. Final surgical pathology with no obvious evidence of malignancy. However, very scant amount of endometrial tissue was noted in specimen making evaluation difficult    S/P successful LELA/BSO on 17 with surgical pathology negative for malignancy; leiomyomata and chronic cervicitis with simple hyperplasia without atypia.      Ca125:  3/30/17: 11.1     Hypothyroidism: treated on current medications per PCP.    Morbid Obesity (BMI: 41.5 17): under the care of PCP.    Dysuria (17)- no CVA tenderness or suprapubic tenderness on exam.  CCMS UA and Urine C&S today. I will follow up with patient as soon as results become  available.     Plan:  Mrs Herrera presented 06/08/17) for an week postoperative appointment. S/P successful LELA/BSO on 05/30/17 with benign pathology. She presented today with no complaints of fever, chills, chest pain, shortness of breath, swelling/pain to extremities.  She denies any abnormal vaginal bleeding or discharge but does report mild dysuria; no back or flank pain.  Her appetite has been good and she is reporting normal bowel function.  Her surgical wound is healing well without erythema, swelling, or active drainage.  Her skin staples were removed today with good edge approximation; Steri-Strips with benzoin were applied after the wound was cleansed with half strength hydrogen peroxide. Patient tolerated well. I encouraged patient to take motrin/advil 600 mg every 6-8 hours which will help with the post operative pain. CCMS U/A and Urine C&S today; we will follow up with patient when results become available. She is to continue all other post operative instructions as given at her preoperative appointment. I reviewed with her the benign surgical pathology prior to her discharge from the hospital (a hard copy was given to patient). She will be scheduled for a follow up appointment in clinic in 4 weeks; sooner if clinically necessary.  I was available for questions and answered them all in lay terms to patient's satisfaction and understanding.  She was pleased with her surgical, postoperative, and clinic care today; she was discharged from the clinic with no unanswered questions.            Professional Services   Consulting Physician/GYN: Serena Mathew MD     [phone]      [phone]  PCP: Al Lerma MD  [phone]     [phone]

## 2022-05-02 NOTE — HISTORICAL [facility] C[facility]ER
This is a historical note converted from Bet[employer]. Formatting and pictures may have been removed.  Please reference Betzy for original formatting and attached multimedia. Chief Complaint  Transfred to swing bed after acute treatment of covid pneumonia  History of Present Illness  70 yo wf with h/o hypothyroidism and recently covid 19 pneumonia, respiratory failure , afib was transferred to our hospital for further treatment . Patient had a lengthy hospitalization.  It started in early december and required intubation, icu treatment.  Currently very weak, tired and not able to do much.  ?  Review of Systems  weak tired.  Left leg pain .  On arrival was hypoxic and placed on 2 L NC  No fever, chills.  Mild cough and sob with ambulation  10 systems reviewed and no other positive and negative findings .  Physical Exam  Vitals & Measurements  T:?36.9? ?C (Oral)? HR:?85(Monitored)? RR:?20? BP:?114/60? SpO2:?95%? WT:?107.6?kg? BMI:?38.58?  Gen: AO x 3, NAD, Obese resting with 2 litter NC  HEENT: NC, AT, RICHIE, EOME, MMM.  Neck: supple no JVD, midline track.  CV: currently RRR in the 70s  Res: severe air movement.? wheezing in the upper fields.  Abdomen:soft , obese no Hepatosplenomegalt, pos BS  : No lessions.  Neuro: 2-12 intact. Good sensation, left leg weakness.  Mus: 3/5 strength in the lower ext.  Skin: no edema and good cap refeel.  Assessment/Plan  1.?Acute respiratory failure due to COVID-19?U07.1,? Pneumonia due to COVID-19 virus?U07.1  3.?Debilitated patient?R53.81  4.?Hypothyroid?E03.9  5.?Morbid obesity?E66.01  6.?Sciatica?M54.30  Orders:  acetaminophen-HYDROcodone, 1 tab(s), form: Tab, Oral, q6hr PRN for pain, first dose 12/28/21 21:14:00 CST  apixaban, 5 mg, form: Tab, Oral, BID, first dose 12/28/21 21:00:00 CST  diltiazem, 180 mg, form: Cap-CD, Oral, Daily, first dose 12/29/21 9:00:00 CST  docusate, 100 mg, form: Cap, Oral, BID, first dose 12/28/21 21:00:00 CST  escitalopram, 10 mg, form: Tab, Oral, Daily,  first dose 12/29/21 9:00:00 CST  gabapentin, 100 mg, form: Cap, Oral, TID PRN for pain, first dose 12/28/21 20:15:00 CST  levothyroxine, 0.05 mg, form: Tab, Oral, Daily, first dose 12/29/21 6:00:00 CST  pantoprazole, 40 mg, form: Tab-EC, Oral, Daily, first dose 12/29/21 9:00:00 CST  polyethylene glycol 3350, 17 gm, form: Powder-Recon, Oral, Daily, first dose 12/29/21 9:00:00 CST  Activity, 12/28/21 17:48:00 CST, Activity as tolerated  Admit to Swing Bed, 12/28/21 17:10:00 CST, Medical Unit Elier KEARNS, Chris, No  Consult Case Management, 12/28/21 17:49:00 CST,  consult for swing bed admit  Consult to Nutritionist Adult, 12/28/21 18:02:00 CST, Routine, PRESSURE ULCER ON ADMIT, Patient has deteriorating pressure ulcer  Consult Wound Care Nurse, 12/28/21 18:02:00 CST, Routine, Pressure Injury, Patient has a deteriorating pressure ulcer  Consult Wound Care Nurse, 12/28/21 17:50:00 CST, Pressure ulcer on admit  Occupational Therapy Eval and Treat, 12/28/21 17:49:00 CST, ADL, Stop date 12/28/21 17:49:00 CST, Patient Bed, Patient has IV, Standard Precautions  Oxygen Therapy, 12/28/21 17:35:00 CST, Nasal Cannula, CM Oxygen  Physical Therapy Eval and Treat, 12/28/21 17:49:00 CST, Mobility, Patient has IV, Standard Precautions  Regular Diet, 12/28/21 17:51:00 CST, Start Meal: Next Meal  Vital Signs, 12/28/21 17:48:00 CST, q8hr  Wound Care, 12/28/21 18:02:00 CST, Once, Stop date 12/28/21 18:02:00 CST  admit to hospital as inpatient  Continue eliquis, levothyroxin.  NC oxygen to keep sat > 92%  PT/OT  OOB with meals  Nebs prn SOB  CXR , cbc, cmp in am  ekg in am   Problem List/Past Medical History  Ongoing  2019-nCoV  Endometrial mass  Morbid obesity  Sciatica  Historical  Chronic back pain  Environmental allergies  Hypothyroid  Obesity  Osteoporosis  Vitamin deficiency  Procedure/Surgical History  Insertion of Endotracheal Airway into Trachea, Via Natural or Artificial Opening (12/02/2021)  Respiratory  Ventilation, Less than 24 Consecutive Hours (2021)  Assistance with Respiratory Ventilation, Less than 24 Consecutive Hours, Continuous Positive Airway Pressure (2021)  Introduction of Remdesivir Anti-infective into Peripheral Vein, Percutaneous Approach, [employer] (2021)  Isolation (2021)  Exploration Laparotomy (.) (2017)  Hysterectomy Total Abdominal w/ BSO (.) (2017)  Resection of Bilateral Fallopian Tubes, Open Approach (2017)  Resection of Bilateral Ovaries, Open Approach (2017)  Resection of Cervix, Open Approach (2017)  Resection of Uterus, Open Approach (2017)  Extraction of Endometrium, Via Natural or Artificial Opening Endoscopic (2017)  Hysteroscopy (.) (2017)  Hysteroscopy, surgical; with sampling (biopsy) of endometrium and/or polypectomy, with or without D & C (2017)  Ankle   delivery  Dilation and curettage  Respiratory Ventilation, Less than 24 Consecutive Hours  TL - Tubal ligation   Medications  Inpatient  acetaminophen-hydrocodone 325 mg-5 mg oral tablet, 1 tab(s), Oral, q6hr, PRN  apixaban, 5 mg= 2 tab(s), Oral, BID  apixaban, 5 mg= 1 tab(s), Oral, BID  Cardizem  mg/24 hours oral CAPsule, extended release, 180 mg= 1 cap(s), Oral, Daily  Cardizem  mg/24 hours oral CAPsule, extended release, 180 mg= 1 cap(s), Oral, Daily  Colace 100 mg oral capsule, 100 mg= 1 cap(s), Oral, BID  Colace 100 mg oral capsule, 100 mg= 1 cap(s), Oral, BID  DuoNeb, 3 mL, NEB, q6hr Resp  gabapentin 100 mg oral capsule, 100 mg= 1 cap(s), Oral, TID, PRN  gabapentin 100 mg oral capsule, 100 mg= 1 cap(s), Oral, TID, PRN  hydrALAZINE 20 mg/mL injectable solution, 10 mg= 0.5 mL, IV, q2hr, PRN  levothyroxine 25 mcg (0.025 mg) oral tablet, 0.075 mg= 3 tab(s), Oral, Daily  levothyroxine 50 mcg (0.05 mg) oral tablet, 0.05 mg= 1 tab(s), Oral, Daily  Lexapro 10 mg oral tablet, 10 mg= 1 tab(s), Oral, Daily  Lexapro 10 mg oral  tablet, 10 mg= 1 tab(s), Oral, Daily  Lopressor 1 mg/mL injectable solution, 5 mg= 5 mL, IV Push, q6hr, PRN  Lopressor 1 mg/mL injectable solution, 5 mg= 5 mL, IV Push, q5min, PRN  MiraLax, 17 gm= 1 packet(s), Oral, Daily  morphine 4 mg/mL preservative-free intravenous solution, 1 mg= 0.25 mL, IV, q6hr, PRN  Norco 5 mg-325 mg oral tablet, 1 tab(s), Oral, q4hr, PRN  polyethylene glycol 3350 ( MiraLax ), 17 gm= 1 packet(s), Oral, Daily  Protonix, 40 mg= 1 tab(s), Oral, Daily  Protonix 40 mg ORAL enteric coated tablet, 40 mg= 1 tab(s), Oral, Daily  Tylenol, 650 mg= 2 tab(s), Oral, q6hr, PRN  Home  acetaminophen-hydrocodone 108 mg-2.5 mg/5 mL oral liquid, 5 mL, Oral, q4hr, PRN  apixaban 5 mg oral tablet, 5 mg= 1 tab(s), Oral, BID  Cardizem  mg/24 hours oral CAPsule, extended release, 180 mg= 1 cap(s), Oral, Daily  cholecalciferol 5000 intl units oral capsule, 5000 IntUnit= 1 cap(s), Oral, Daily,? ?Not taking  Colace 100 mg oral capsule, 100 mg= 1 cap(s), Oral, BID  gabapentin 100 mg oral capsule, 100 mg= 1 cap(s), Oral, TID, PRN  hydrALAZINE (Apresoline) Inj., 10 mg, Oral, q2hr  levothyroxine 50 mcg (0.05 mg) oral tablet, 1.5 tab, Oral, Daily  Lexapro 10 mg oral tablet, 10 mg= 1 tab(s), Oral, Daily  Lopressor 1 mg/mL injectable solution, 5 mg, IV, Once  MiraLax oral powder for reconstitution, 17 gm, Oral, Daily  Multivitamin Liquid - Adult, tablet daily  Andreas Essentials,? 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 Living situation: Home/Independent., 11/30/2021  Lives with Spouse., 03/29/2017  Sexual  Sexually active: No. Sexual orientation: Straight or heterosexual. Gender Identity Identifies as female.,  11/30/2021  Substance Use  Never, 03/29/2017  Tobacco  Never (less than 100 in lifetime), N/A, 12/28/2021  Not obtained due to cognitive impairment, N/A, 12/02/2021  Never (less than 100 in lifetime), N/A, 11/30/2021  Never (less than 100 in lifetime), No, 11/29/2021  Never (less than 100 in lifetime), N/A, 04/26/2021  Never smoker, 03/29/2017  Family History  Primary malignant neoplasm of breast: Mother and Sister.  Primary malignant neoplasm of colon: Mother.  Lab Results  Test Name Test Result Date/Time   Chloride 113 mmol/L (High) 12/20/2021 01:59 CST   CO2 22 mmol/L (Low) 12/20/2021 01:59 CST   BUN 12.7 mg/dL 12/20/2021 01:59 CST   Creatinine 0.49 mg/dL (Low) 12/20/2021 01:59 CST   WBC 5.7 x10(3)/mcL 12/20/2021 01:59 CST   RBC 3.36 x10(6)/mcL (Low) 12/20/2021 01:59 CST   Hgb 10.0 gm/dL (Low) 12/20/2021 01:59 CST   Hct 30.9 % (Low) 12/20/2021 01:59 CST   Platelet 268 x10(3)/mcL 12/20/2021 01:59 CST   MCV 92.0 fL 12/20/2021 01:59 CST   MCH 29.8 pg 12/20/2021 01:59 CST   MCHC 32.4 gm/dL (Low) 12/20/2021 01:59 CST   RDW 16.8 % 12/20/2021 01:59 CST   MPV 10.7 fL 12/20/2021 01:59 CST   Abs Neut 2.91 x10(3)/mcL 12/20/2021 01:59 CST   Neutro Auto 51 % 12/20/2021 01:59 CST   Lymph Auto 24 % 12/20/2021 01:59 CST   Diagnostic Results  (12/25/2021 10:45 CST CT Head W/O Contrast)  ?? ? ? ??  * Final Report *  ?  Reason For Exam  CVA  ?  Radiology Report  History: CVA  Comparison studies:  None  ?  Technique:?  Axial scans were obtained from skull base to the vertex.  Coronal and sagittal reconstructions obtained from the axial data.?  Automatic exposure control was utilized to limit radiation dose.  Contrast: None  ?  Radiation Dose:  Total DLP: 997 mGy*cm  ?  DISCUSSION:  ?  Scalp/Skull:  No abnormalities.  ?  Brain sulci: Appropriate for patients age.  Ventricles: Normal in size and configuration. No hydrocephalus.  ?  Extra-axial spaces:  No masses or fluid collections.  ?  Parenchyma:?  No abnormal densities.  No mass,  hemorrhage or CT evidence of an acute vascular insult.  ?  Dural sinuses: No abnormal densities.  Sellar/Suprasellar region: No abnormalities.  Skull base and Craniocervical junction: No abnormalities.  ?  Incidental findings:  Carotid siphon and vertebrobasilar atherosclerotic vascular  calcifications.  Left posterior ethmoid air cell fluid level with aerated secretions.  ?  IMPRESSION:  ?  No acute intracranial abnormalities.  ?  Signature Line  Electronically Signed By: Lisa Sanches MD  Date/Time Signed: 12/25/2021 10:54  ?  Technical Comments  Home Medication Reviewed? No?  ?  ?  This document has an image [1] (12/11/2021 08:30 CST XR Chest 1 View)  ?? ? ? ??  * Final Report *  ?  Reason For Exam  Dyspnea  ?  Radiology Report  one view of the chest  ?  CPT 78426  ?  HISTORY:  Dyspnea  ?  FINDINGS:  Examination reveals cardiomegaly and mediastinal silhouette to be  unchanged as compared with previous exam. Is significant rotation on  the images provided the left costophrenic angle was not completely  included on the projections provided.  ?  There is haziness throughout the right lung which might be related to  rotation and overlying soft tissues.  ?  There appears to be some increased left retrocardiac density and  silhouetting of the left hemidiaphragm which might be related to an  infiltrate/atelectasis.  ?  The confluent infiltrative changes in the left perihilar region have  improved.  ?  Support catheters remain in place  ?  IMPRESSION: Suboptimal exam with marked rotation haziness at the right  lung might be related to rotation and overlying soft tissues.  ?  Increased left retrocardiac density with silhouetting of the left  hemidiaphragm as above.  ?  Improved alignment in the infiltrative changes in the left perihilar  region  ?  Signature Line  Electronically Signed By: Jh Pimentel MD  Date/Time Signed: 12/11/2021 09:16  ?  ?  This document has an image  ?  Result type:???????XR Chest 1  View  Result date:???????December 11, 2021 8:30 CST  Result status:???????Auth (Verified)  Result title:???????XR Chest 1 View  Performed by:???????Jh Pimentel MD on December 11, 2021 9:12 CST  Verified by:???????Jh Pimentel MD on December 11, 2021 9:16 CST  Encounter info:???????[id number], [facility], Inpatient, 12/2/2021 - 12/28/2021 [2]     [1]?CT Head W/O Contrast; Myron KEARNS, Lisa Lr 12/25/2021 10:45 CST  [2]?XR Chest 1 View; Jh Pimentel MD 12/11/2021 08:30 CST

## 2022-05-02 NOTE — HISTORICAL [facility]
This is a historical note converted from [employer]. Formatting and pictures may have been removed.  Please reference [employer] for original formatting and attached multimedia. Admit and Discharge Dates  Admit Date: 12/28/2021  Discharge Date: 01/12/2022  Physicians  Attending Physician - Elier KEARNS, Chris  Primary Care Physician - Alycia KEARNS, Javon BLACNHARD  Discharge Diagnosis  1.?Pneumonia due to COVID-19 virus?U07.1,?Acute respiratory failure due to COVID-19?U07.1  3.?Hypothyroid?E03.9  4.?Debilitated patient?R53.81  5.?Foot drop, left?M21.372  6.?Atrial fibrillation, chronic?I48.20  7.?Morbid obesity?E66.01  8.?Sciatica?M54.30  9.?Obesity?E66.9  Surgical Procedures  No procedures recorded for this visit.  Immunizations  No immunizations recorded for this visit.  Admission Information  72 yo wf with h/o hypothyroidism and recently covid 19 pneumonia, respiratory failure , afib was transferred to our hospital for further treatment . Patient had a lengthy hospitalization.  It started in early december and required intubation, icu treatment.?  Significant Findings  left foot drop, exercise endurance was poor, improved since admit. working with PT, lateral foot movement is improved however still unable to flex ankle or toes.  Time Spent on discharge  37 min  Objective  Vitals & Measurements  T:?36.7? ?C (Oral)? TMIN:?36.6? ?C (Oral)? TMAX:?36.8? ?C (Oral)? HR:?88(Peripheral)? RR:?20? BP:?105/70? SpO2:?96%?  Physical Exam  Gen: AO x 3, NAD, Obese resting with 2 litter NC  HEENT: NC, AT, RICHIE, EOME, MMM.  CV: currently RRR in the 70s  Res: severe air movement.? wheezing in the upper fields.  Abdomen:soft , obese no Hepatosplenomegalt, pos BS  : No lessions.  Neuro: 2-12 intact. Good sensation, left leg weakness.  Mus: 3/5 strength in the lower ext.  Skin: no edema and good cap refeel.  Patient Discharge Condition  improved  Discharge Disposition  home with home health   Discharge Medication  Reconciliation  Prescribed  gabapentin (gabapentin 300 mg oral capsule)?600 mg, Oral, TID  levothyroxine (levothyroxine 50 mcg (0.05 mg) oral tablet)?0.05 mg, Oral, Daily  Continue  apixaban (apixaban 5 mg oral tablet)?5 mg, Oral, BID  apixaban (apixaban 5 mg oral tablet)?5 mg, Oral, BID  cholecalciferol (cholecalciferol 5000 intl units oral capsule)?5,000 IntUnit, Oral, Daily  diltiazem (Cardizem  mg/24 hours oral CAPsule, extended release)?180 mg, Oral, Daily  diltiazem (Cardizem  mg/24 hours oral CAPsule, extended release)?180 mg, Oral, Daily  escitalopram (Lexapro 10 mg oral tablet)?10 mg, Oral, Daily  multivitamin (Multivitamin Liquid - Adult)?tablet daily  omega-3 polyunsaturated fatty acids (Omega Essentials)  pantoprazole (Protonix 40 mg ORAL enteric coated tablet)?40 mg, Oral, Daily  pantoprazole (Protonix 40 mg ORAL enteric coated tablet)?40 mg, Oral, Daily  Discontinue  acetaminophen-HYDROcodone (acetaminophen-hydrocodone 108 mg-2.5 mg/5 mL oral liquid)?5 mL, Oral, q4hr, PRN as needed for pain  docusate (Colace 100 mg oral capsule)?100 mg, Oral, BID  gabapentin (gabapentin 100 mg oral capsule)?100 mg, Oral, TID, PRN pain  hydrALAZINE (hydrALAZINE (Apresoline) Inj.)?10 mg, Oral, q2hr  levothyroxine (levothyroxine 50 mcg (0.05 mg) oral tablet)?1.5 tab, Oral, Daily  metoprolol (Lopressor 1 mg/mL injectable solution)?5 mg, IV, Once  Education and Orders Provided  Discharge - 01/12/22 10:19:00 CST, Home, Give all scheduled vaccinations prior to discharge.?  Discharge Activity - Activity as Tolerated?  Discharge Diet - Regular?  Follow up  [facility]: [employer], on 01/12/2022  Javon Tong, on 01/20/2022  [employer]  No Qualifying Data

## 2022-11-07 ENCOUNTER — HOSPITAL ENCOUNTER (OUTPATIENT)
Dept: RADIOLOGY | Facility: HOSPITAL | Age: 72
Discharge: HOME | End: 2022-11-07
Attending: FAMILY MEDICINE
Payer: MEDICARE

## 2022-11-07 DIAGNOSIS — Z12.31 ENCOUNTER FOR SCREENING MAMMOGRAM FOR BREAST CANCER: ICD-10-CM

## 2022-11-07 PROCEDURE — 77063 BREAST TOMOSYNTHESIS BI: CPT | Mod: 26,,, | Performed by: RADIOLOGY

## 2022-11-07 PROCEDURE — 77067 MAMMO DIGITAL SCREENING BILAT WITH TOMO: ICD-10-PCS | Mod: 26,,, | Performed by: RADIOLOGY

## 2022-11-07 PROCEDURE — 77067 SCR MAMMO BI INCL CAD: CPT | Mod: 26,,, | Performed by: RADIOLOGY

## 2022-11-07 PROCEDURE — 77067 SCR MAMMO BI INCL CAD: CPT | Mod: TC

## 2022-11-07 PROCEDURE — 77063 MAMMO DIGITAL SCREENING BILAT WITH TOMO: ICD-10-PCS | Mod: 26,,, | Performed by: RADIOLOGY

## 2022-11-14 ENCOUNTER — OFFICE VISIT (OUTPATIENT)
Dept: FAMILY MEDICINE | Facility: CLINIC | Age: 72
End: 2022-11-14
Payer: MEDICARE

## 2022-11-14 VITALS
SYSTOLIC BLOOD PRESSURE: 132 MMHG | RESPIRATION RATE: 18 BRPM | OXYGEN SATURATION: 98 % | DIASTOLIC BLOOD PRESSURE: 80 MMHG | BODY MASS INDEX: 44.9 KG/M2 | WEIGHT: 279.38 LBS | HEIGHT: 66 IN | TEMPERATURE: 98 F | HEART RATE: 77 BPM

## 2022-11-14 DIAGNOSIS — E03.9 HYPOTHYROIDISM, UNSPECIFIED TYPE: ICD-10-CM

## 2022-11-14 DIAGNOSIS — G25.0 ESSENTIAL TREMOR: ICD-10-CM

## 2022-11-14 DIAGNOSIS — F41.1 GENERALIZED ANXIETY DISORDER: Primary | ICD-10-CM

## 2022-11-14 DIAGNOSIS — E78.2 MIXED HYPERLIPIDEMIA: ICD-10-CM

## 2022-11-14 DIAGNOSIS — I48.20 CHRONIC ATRIAL FIBRILLATION: ICD-10-CM

## 2022-11-14 DIAGNOSIS — F32.9 CURRENT EPISODE OF MAJOR DEPRESSIVE DISORDER WITHOUT PRIOR EPISODE, UNSPECIFIED DEPRESSION EPISODE SEVERITY: ICD-10-CM

## 2022-11-14 PROBLEM — N85.9 ENDOMETRIAL DISORDER: Status: ACTIVE | Noted: 2022-11-14

## 2022-11-14 PROBLEM — M54.30 SCIATICA: Status: ACTIVE | Noted: 2022-11-14

## 2022-11-14 PROBLEM — I48.91 ATRIAL FIBRILLATION: Status: ACTIVE | Noted: 2022-01-20

## 2022-11-14 PROBLEM — E66.01 MORBID OBESITY: Status: ACTIVE | Noted: 2022-11-14

## 2022-11-14 PROCEDURE — 1160F PR REVIEW ALL MEDS BY PRESCRIBER/CLIN PHARMACIST DOCUMENTED: ICD-10-PCS | Mod: CPTII,,, | Performed by: FAMILY MEDICINE

## 2022-11-14 PROCEDURE — 99204 PR OFFICE/OUTPT VISIT, NEW, LEVL IV, 45-59 MIN: ICD-10-PCS | Mod: ,,, | Performed by: FAMILY MEDICINE

## 2022-11-14 PROCEDURE — 1159F PR MEDICATION LIST DOCUMENTED IN MEDICAL RECORD: ICD-10-PCS | Mod: CPTII,,, | Performed by: FAMILY MEDICINE

## 2022-11-14 PROCEDURE — 3288F PR FALLS RISK ASSESSMENT DOCUMENTED: ICD-10-PCS | Mod: CPTII,,, | Performed by: FAMILY MEDICINE

## 2022-11-14 PROCEDURE — 1101F PR PT FALLS ASSESS DOC 0-1 FALLS W/OUT INJ PAST YR: ICD-10-PCS | Mod: CPTII,,, | Performed by: FAMILY MEDICINE

## 2022-11-14 PROCEDURE — 3075F SYST BP GE 130 - 139MM HG: CPT | Mod: CPTII,,, | Performed by: FAMILY MEDICINE

## 2022-11-14 PROCEDURE — 1160F RVW MEDS BY RX/DR IN RCRD: CPT | Mod: CPTII,,, | Performed by: FAMILY MEDICINE

## 2022-11-14 PROCEDURE — 1101F PT FALLS ASSESS-DOCD LE1/YR: CPT | Mod: CPTII,,, | Performed by: FAMILY MEDICINE

## 2022-11-14 PROCEDURE — 1126F AMNT PAIN NOTED NONE PRSNT: CPT | Mod: CPTII,,, | Performed by: FAMILY MEDICINE

## 2022-11-14 PROCEDURE — 3075F PR MOST RECENT SYSTOLIC BLOOD PRESS GE 130-139MM HG: ICD-10-PCS | Mod: CPTII,,, | Performed by: FAMILY MEDICINE

## 2022-11-14 PROCEDURE — 3079F PR MOST RECENT DIASTOLIC BLOOD PRESSURE 80-89 MM HG: ICD-10-PCS | Mod: CPTII,,, | Performed by: FAMILY MEDICINE

## 2022-11-14 PROCEDURE — 99204 OFFICE O/P NEW MOD 45 MIN: CPT | Mod: ,,, | Performed by: FAMILY MEDICINE

## 2022-11-14 PROCEDURE — 1159F MED LIST DOCD IN RCRD: CPT | Mod: CPTII,,, | Performed by: FAMILY MEDICINE

## 2022-11-14 PROCEDURE — 3288F FALL RISK ASSESSMENT DOCD: CPT | Mod: CPTII,,, | Performed by: FAMILY MEDICINE

## 2022-11-14 PROCEDURE — 1126F PR PAIN SEVERITY QUANTIFIED, NO PAIN PRESENT: ICD-10-PCS | Mod: CPTII,,, | Performed by: FAMILY MEDICINE

## 2022-11-14 PROCEDURE — 3079F DIAST BP 80-89 MM HG: CPT | Mod: CPTII,,, | Performed by: FAMILY MEDICINE

## 2022-11-14 PROCEDURE — 3008F BODY MASS INDEX DOCD: CPT | Mod: CPTII,,, | Performed by: FAMILY MEDICINE

## 2022-11-14 PROCEDURE — 3008F PR BODY MASS INDEX (BMI) DOCUMENTED: ICD-10-PCS | Mod: CPTII,,, | Performed by: FAMILY MEDICINE

## 2022-11-14 RX ORDER — LEVOTHYROXINE SODIUM 75 UG/1
75 TABLET ORAL
COMMUNITY
Start: 2022-10-02 | End: 2023-01-18 | Stop reason: SDUPTHER

## 2022-11-14 RX ORDER — DULOXETIN HYDROCHLORIDE 30 MG/1
30 CAPSULE, DELAYED RELEASE ORAL DAILY
Qty: 30 CAPSULE | Refills: 0 | Status: SHIPPED | OUTPATIENT
Start: 2022-11-14 | End: 2022-12-12

## 2022-11-14 RX ORDER — ATORVASTATIN CALCIUM 10 MG/1
10 TABLET, FILM COATED ORAL DAILY
Qty: 90 TABLET | Refills: 3 | Status: SHIPPED | OUTPATIENT
Start: 2022-11-14 | End: 2023-07-19

## 2022-11-14 RX ORDER — GABAPENTIN 600 MG/1
600 TABLET ORAL 3 TIMES DAILY
COMMUNITY
Start: 2022-10-02 | End: 2023-03-07

## 2022-11-14 RX ORDER — ATORVASTATIN CALCIUM 10 MG/1
1 TABLET, FILM COATED ORAL DAILY
COMMUNITY
Start: 2022-08-28 | End: 2022-11-14 | Stop reason: SDUPTHER

## 2022-11-14 RX ORDER — ACETAMINOPHEN, DIPHENHYDRAMINE HCL, PHENYLEPHRINE HCL 325; 25; 5 MG/1; MG/1; MG/1
1 TABLET ORAL NIGHTLY PRN
COMMUNITY

## 2022-11-14 RX ORDER — DILTIAZEM HYDROCHLORIDE 180 MG/1
1 CAPSULE, COATED, EXTENDED RELEASE ORAL DAILY
COMMUNITY
Start: 2022-10-02 | End: 2023-07-19

## 2022-11-14 RX ORDER — ACETAMINOPHEN 500 MG
2000 TABLET ORAL DAILY
COMMUNITY
End: 2023-07-19

## 2022-11-14 RX ORDER — APIXABAN 5 MG/1
5 TABLET, FILM COATED ORAL 2 TIMES DAILY
COMMUNITY
Start: 2022-10-10

## 2022-11-14 NOTE — PROGRESS NOTES
Patient ID: [id number]     Chief Complaint: Establish Care        HPI:     Estephania Herrera is a 72 y.o. female here today for Establish Care. Doing well overall. Had a very complicated covid course including intubation for 2 weeks and subsequent foot drop of left foot. Also has right sided piriformis syndrome.         ----------------------------  Acute respiratory failure due to COVID-19  Chronic atrial fibrillation  Chronic back pain  Edema of lower extremity  Endometrial mass  Family history of bladder cancer      Comment:  brother  Family history of breast cancer in mother  Family history of breast cancer in sister  Family history of colon cancer in mother  Hair loss  History of falling  History of pressure ulcer  HLD (hyperlipidemia)  HTN (hypertension)  Hypothyroidism, unspecified  Insomnia  Left foot drop  Mitral regurgitation  Moderate aortic regurgitation  DELANEY (obstructive sleep apnea)  DELANEY on CPAP  Osteoporosis  PAC (premature atrial contraction)  Pneumonia due to COVID-19 virus  Pulmonic valve regurgitation  PVC (premature ventricular contraction)  Sciatica  SVT (supraventricular tachycardia)  Tricuspid regurgitation  Vitamin D deficiency     Past Surgical History:   Procedure Laterality Date    ANKLE SURGERY      CATARACT EXTRACTION  10/27/2022    Dr Avis Pastor    CATARACT EXTRACTION  2022     SECTION  1971    Dr Albina Rendon     SECTION  1973    Dr Jeremias Dixon    FRACTURE SURGERY  1965    HYSTEROSCOPY WITH DILATION AND CURETTAGE OF UTERUS  2017    Dr Galdino Del Angel    TOTAL ABDOMINAL HYSTERECTOMY W/ BILATERAL SALPINGOOPHORECTOMY Bilateral 2017    Dr Galdino Del Angel    TUBAL LIGATION  1980       Review of patient's allergies indicates:  No Known Allergies    Outpatient Medications Marked as Taking for the 22 encounter (Office Visit) with Keith Briones DO   Medication Sig Dispense Refill    cholecalciferol, vitamin D3, (VITAMIN D3) 50 mcg (2,000 unit) Cap  capsule Take 2,000 Units by mouth once daily.      diltiaZEM (CARDIZEM CD) 180 MG 24 hr capsule Take 1 capsule by mouth once daily.      ELIQUIS 5 mg Tab Take 5 mg by mouth 2 (two) times daily.      gabapentin (NEURONTIN) 600 MG tablet Take 600 mg by mouth 3 (three) times daily.      levothyroxine (SYNTHROID) 75 MCG tablet Take 75 mcg by mouth before breakfast.      melatonin 10 mg Tab Take 1 tablet by mouth nightly as needed for Insomnia.      UNABLE TO FIND Take 1 tablet by mouth 2 (two) times a day. [employer]      UNABLE TO FIND Take 1 tablet by mouth 2 (two) times a day. [employer]      [DISCONTINUED] atorvastatin (LIPITOR) 10 MG tablet Take 1 tablet by mouth once daily.         Social History     Socioeconomic History    Marital status:    Tobacco Use    Smoking status: Former     Packs/day: 1.00     Years: 5.00     Pack years: 5.00     Types: Cigarettes    Smokeless tobacco: Never   Substance and Sexual Activity    Alcohol use: Never    Drug use: Never    Sexual activity: Not Currently     Birth control/protection: See Surgical Hx, Post-menopausal        Family History   Problem Relation Age of Onset    Breast cancer Mother         s/p R mastectomy    Coronary artery disease Mother         CABGx4 , PTCA w/ stents x2    Hypertension Mother     Colon cancer Mother         s/p colostomy/w colon resection    Bradycardia Mother     Hepatitis Mother         Chronic Hepatitis    Atrial fibrillation Mother     Pacemaker/defibrilator Mother     Arthritis Father     Hearing loss Father     Coronary artery disease Father         PTCA w/ stent    Hypertension Father     Ulcers Father 93        Perforated gastric ulcer - Cause of death    Aortic aneurysm Father         AAA    Breast cancer Sister     Bladder Cancer Brother     Coronary artery disease Brother         PTCA    Hypertension Brother     Heart attack Brother         Patient Care Team:  Javon Tong MD as PCP - General (Family  "Medicine)  Carl Bustos MD as Consulting Physician (Otolaryngology)  Steve Tamez MD as Consulting Physician (Cardiology)  Avis Pastor MD (Ophthalmology)     Subjective:     Review of Systems   Constitutional:  Negative for fever.   Respiratory:  Negative for cough and shortness of breath.    Cardiovascular:  Negative for chest pain and palpitations.   Gastrointestinal:  Negative for abdominal pain, constipation, diarrhea, heartburn and nausea.   Neurological:  Negative for dizziness and headaches.   Psychiatric/Behavioral:  Positive for depression. The patient is nervous/anxious.      See HPI for details  All Other ROS: Negative except as stated in HPI.       Objective:     /80   Pulse 77   Temp 97.9 °F (36.6 °C) (Tympanic)   Resp 18   Ht 5' 5.75" (1.67 m)   Wt 126.7 kg (279 lb 6.4 oz)   SpO2 98%   BMI 45.44 kg/m²     Physical Exam  Vitals reviewed.   Constitutional:       General: She is not in acute distress.     Appearance: Normal appearance.   Cardiovascular:      Rate and Rhythm: Normal rate and regular rhythm.      Heart sounds: No murmur heard.    No friction rub. No gallop.   Pulmonary:      Effort: No respiratory distress.      Breath sounds: No wheezing, rhonchi or rales.   Skin:     General: Skin is warm and dry.      Findings: No lesion or rash.   Neurological:      General: No focal deficit present.      Mental Status: She is alert.   Psychiatric:         Mood and Affect: Mood normal.       Assessment/Plan:     1. Generalized anxiety disorder  -     DULoxetine (CYMBALTA) 30 MG capsule; Take 1 capsule (30 mg total) by mouth once daily.  Dispense: 30 capsule; Refill: 0    2. Essential tremor  -     Comprehensive Metabolic Panel; Future; Expected date: 11/14/2022    3. Current episode of major depressive disorder without prior episode, unspecified depression episode severity  -     DULoxetine (CYMBALTA) 30 MG capsule; Take 1 capsule (30 mg total) by mouth once daily.  Dispense: 30 " capsule; Refill: 0    4. Mixed hyperlipidemia  -     atorvastatin (LIPITOR) 10 MG tablet; Take 1 tablet (10 mg total) by mouth once daily.  Dispense: 90 tablet; Refill: 3  -     Lipid Panel; Future; Expected date: 11/14/2022    5. Chronic atrial fibrillation  -     CBC Auto Differential; Future; Expected date: 11/14/2022  -     Comprehensive Metabolic Panel; Future; Expected date: 11/14/2022    6. Hypothyroidism, unspecified type  -     CBC Auto Differential; Future; Expected date: 11/14/2022  -     TSH; Future; Expected date: 11/14/2022      -Will try patient on duloxetine for anxiety and also for chronic pain she is experiencing.   Educated patient on the risks of serotonin based medications such as serotonin modulators and SSRIs/SNRIs.   Risks discussed include but were not limited to common side effects of the specific medications, risk for worsening symptoms of depression including development of suicidal thoughts or ideations, and serotonin syndrome.   Counseled patient on expected time course of treatment plan including potential benefits of medication not becoming noticeable until up to 6 weeks from start date. Patient voiced understanding of the risks and plan.      Follow up:     Follow up in about 6 weeks (around 12/26/2022). In addition to their scheduled follow up, the patient has also been instructed to follow up on as needed basis.

## 2022-11-25 NOTE — PROGRESS NOTES
Patient, Estephania Herrera (MRN #[id number]), presented with a recorded BMI of 45.44 kg/m^2 consistent with the definition of morbid obesity (ICD-10 E66.01). The patient's morbid obesity was monitored, evaluated, addressed and/or treated. This addendum to the medical record is made on 11/25/2022.

## 2022-12-12 DIAGNOSIS — F32.9 CURRENT EPISODE OF MAJOR DEPRESSIVE DISORDER WITHOUT PRIOR EPISODE, UNSPECIFIED DEPRESSION EPISODE SEVERITY: ICD-10-CM

## 2022-12-12 DIAGNOSIS — F41.1 GENERALIZED ANXIETY DISORDER: ICD-10-CM

## 2022-12-12 RX ORDER — DULOXETIN HYDROCHLORIDE 30 MG/1
CAPSULE, DELAYED RELEASE ORAL
Qty: 30 CAPSULE | Refills: 5 | Status: SHIPPED | OUTPATIENT
Start: 2022-12-12 | End: 2022-12-28 | Stop reason: SINTOL

## 2022-12-16 ENCOUNTER — LAB VISIT (OUTPATIENT)
Dept: LAB | Facility: HOSPITAL | Age: 72
End: 2022-12-16
Attending: FAMILY MEDICINE
Payer: MEDICARE

## 2022-12-16 DIAGNOSIS — E03.9 HYPOTHYROIDISM, UNSPECIFIED TYPE: ICD-10-CM

## 2022-12-16 DIAGNOSIS — I48.20 CHRONIC ATRIAL FIBRILLATION: ICD-10-CM

## 2022-12-16 DIAGNOSIS — E78.2 MIXED HYPERLIPIDEMIA: ICD-10-CM

## 2022-12-16 DIAGNOSIS — G25.0 ESSENTIAL TREMOR: ICD-10-CM

## 2022-12-16 LAB
ALBUMIN SERPL-MCNC: 3.9 G/DL (ref 3.4–4.8)
ALBUMIN/GLOB SERPL: 1.1 RATIO (ref 1.1–2)
ALP SERPL-CCNC: 78 UNIT/L (ref 40–150)
ALT SERPL-CCNC: 15 UNIT/L (ref 0–55)
AST SERPL-CCNC: 17 UNIT/L (ref 5–34)
BASOPHILS # BLD AUTO: 0.05 X10(3)/MCL (ref 0–0.2)
BASOPHILS NFR BLD AUTO: 0.9 %
BILIRUBIN DIRECT+TOT PNL SERPL-MCNC: 0.6 MG/DL
BUN SERPL-MCNC: 9 MG/DL (ref 9.8–20.1)
CALCIUM SERPL-MCNC: 9.3 MG/DL (ref 8.4–10.2)
CHLORIDE SERPL-SCNC: 112 MMOL/L (ref 98–107)
CHOLEST SERPL-MCNC: 180 MG/DL
CHOLEST/HDLC SERPL: 3 {RATIO} (ref 0–5)
CO2 SERPL-SCNC: 20 MMOL/L (ref 23–31)
CREAT SERPL-MCNC: 0.74 MG/DL (ref 0.55–1.02)
EOSINOPHIL # BLD AUTO: 0.14 X10(3)/MCL (ref 0–0.9)
EOSINOPHIL NFR BLD AUTO: 2.4 %
ERYTHROCYTE [DISTWIDTH] IN BLOOD BY AUTOMATED COUNT: 16 % (ref 11–14.5)
GFR SERPLBLD CREATININE-BSD FMLA CKD-EPI: >60 MLS/MIN/1.73/M2
GLOBULIN SER-MCNC: 3.5 GM/DL (ref 2.4–3.5)
GLUCOSE SERPL-MCNC: 119 MG/DL (ref 82–115)
HCT VFR BLD AUTO: 43.7 % (ref 37–47)
HDLC SERPL-MCNC: 54 MG/DL (ref 35–60)
HGB BLD-MCNC: 13.6 GM/DL (ref 12–16)
IMM GRANULOCYTES # BLD AUTO: 0.01 X10(3)/MCL (ref 0–0.04)
IMM GRANULOCYTES NFR BLD AUTO: 0.2 %
LDLC SERPL CALC-MCNC: 98 MG/DL (ref 50–140)
LYMPHOCYTES # BLD AUTO: 1.49 X10(3)/MCL (ref 0.6–4.6)
LYMPHOCYTES NFR BLD AUTO: 25.3 %
MCH RBC QN AUTO: 28.4 PG
MCHC RBC AUTO-ENTMCNC: 31.1 MG/DL (ref 33–36)
MCV RBC AUTO: 91.2 FL (ref 80–94)
MONOCYTES # BLD AUTO: 0.57 X10(3)/MCL (ref 0.1–1.3)
MONOCYTES NFR BLD AUTO: 9.7 %
NEUTROPHILS # BLD AUTO: 3.62 X10(3)/MCL (ref 2.1–9.2)
NEUTROPHILS NFR BLD AUTO: 61.5 %
NRBC BLD AUTO-RTO: 0 % (ref 0–1)
PLATELET # BLD AUTO: 283 X10(3)/MCL (ref 140–371)
PMV BLD AUTO: 11.5 FL (ref 9.4–12.4)
POTASSIUM SERPL-SCNC: 4 MMOL/L (ref 3.5–5.1)
PROT SERPL-MCNC: 7.4 GM/DL (ref 5.8–7.6)
RBC # BLD AUTO: 4.79 X10(6)/MCL (ref 4.2–5.4)
SODIUM SERPL-SCNC: 142 MMOL/L (ref 136–145)
TRIGL SERPL-MCNC: 139 MG/DL (ref 37–140)
TSH SERPL-ACNC: 4.21 UIU/ML (ref 0.35–4.94)
VLDLC SERPL CALC-MCNC: 28 MG/DL
WBC # SPEC AUTO: 5.9 X10(3)/MCL (ref 4.5–11.5)

## 2022-12-16 PROCEDURE — 36415 COLL VENOUS BLD VENIPUNCTURE: CPT

## 2022-12-16 PROCEDURE — 85025 COMPLETE CBC W/AUTO DIFF WBC: CPT

## 2022-12-16 PROCEDURE — 80061 LIPID PANEL: CPT

## 2022-12-16 PROCEDURE — 84443 ASSAY THYROID STIM HORMONE: CPT

## 2022-12-16 PROCEDURE — 80053 COMPREHEN METABOLIC PANEL: CPT

## 2022-12-28 ENCOUNTER — OFFICE VISIT (OUTPATIENT)
Dept: FAMILY MEDICINE | Facility: CLINIC | Age: 72
End: 2022-12-28
Payer: MEDICARE

## 2022-12-28 VITALS
HEART RATE: 68 BPM | WEIGHT: 275.38 LBS | HEIGHT: 66 IN | DIASTOLIC BLOOD PRESSURE: 80 MMHG | BODY MASS INDEX: 44.26 KG/M2 | SYSTOLIC BLOOD PRESSURE: 136 MMHG | RESPIRATION RATE: 20 BRPM | OXYGEN SATURATION: 97 % | TEMPERATURE: 99 F

## 2022-12-28 DIAGNOSIS — M54.30 SCIATICA, UNSPECIFIED LATERALITY: ICD-10-CM

## 2022-12-28 DIAGNOSIS — G25.0 ESSENTIAL TREMOR: Primary | ICD-10-CM

## 2022-12-28 PROCEDURE — 99214 PR OFFICE/OUTPT VISIT, EST, LEVL IV, 30-39 MIN: ICD-10-PCS | Mod: ,,, | Performed by: FAMILY MEDICINE

## 2022-12-28 PROCEDURE — 1126F AMNT PAIN NOTED NONE PRSNT: CPT | Mod: CPTII,,, | Performed by: FAMILY MEDICINE

## 2022-12-28 PROCEDURE — 1159F PR MEDICATION LIST DOCUMENTED IN MEDICAL RECORD: ICD-10-PCS | Mod: CPTII,,, | Performed by: FAMILY MEDICINE

## 2022-12-28 PROCEDURE — 3008F PR BODY MASS INDEX (BMI) DOCUMENTED: ICD-10-PCS | Mod: CPTII,,, | Performed by: FAMILY MEDICINE

## 2022-12-28 PROCEDURE — 3288F PR FALLS RISK ASSESSMENT DOCUMENTED: ICD-10-PCS | Mod: CPTII,,, | Performed by: FAMILY MEDICINE

## 2022-12-28 PROCEDURE — 1126F PR PAIN SEVERITY QUANTIFIED, NO PAIN PRESENT: ICD-10-PCS | Mod: CPTII,,, | Performed by: FAMILY MEDICINE

## 2022-12-28 PROCEDURE — 3079F PR MOST RECENT DIASTOLIC BLOOD PRESSURE 80-89 MM HG: ICD-10-PCS | Mod: CPTII,,, | Performed by: FAMILY MEDICINE

## 2022-12-28 PROCEDURE — 99214 OFFICE O/P EST MOD 30 MIN: CPT | Mod: ,,, | Performed by: FAMILY MEDICINE

## 2022-12-28 PROCEDURE — 1160F RVW MEDS BY RX/DR IN RCRD: CPT | Mod: CPTII,,, | Performed by: FAMILY MEDICINE

## 2022-12-28 PROCEDURE — 1159F MED LIST DOCD IN RCRD: CPT | Mod: CPTII,,, | Performed by: FAMILY MEDICINE

## 2022-12-28 PROCEDURE — 3075F SYST BP GE 130 - 139MM HG: CPT | Mod: CPTII,,, | Performed by: FAMILY MEDICINE

## 2022-12-28 PROCEDURE — 3079F DIAST BP 80-89 MM HG: CPT | Mod: CPTII,,, | Performed by: FAMILY MEDICINE

## 2022-12-28 PROCEDURE — 1101F PR PT FALLS ASSESS DOC 0-1 FALLS W/OUT INJ PAST YR: ICD-10-PCS | Mod: CPTII,,, | Performed by: FAMILY MEDICINE

## 2022-12-28 PROCEDURE — 3008F BODY MASS INDEX DOCD: CPT | Mod: CPTII,,, | Performed by: FAMILY MEDICINE

## 2022-12-28 PROCEDURE — 3075F PR MOST RECENT SYSTOLIC BLOOD PRESS GE 130-139MM HG: ICD-10-PCS | Mod: CPTII,,, | Performed by: FAMILY MEDICINE

## 2022-12-28 PROCEDURE — 1101F PT FALLS ASSESS-DOCD LE1/YR: CPT | Mod: CPTII,,, | Performed by: FAMILY MEDICINE

## 2022-12-28 PROCEDURE — 1160F PR REVIEW ALL MEDS BY PRESCRIBER/CLIN PHARMACIST DOCUMENTED: ICD-10-PCS | Mod: CPTII,,, | Performed by: FAMILY MEDICINE

## 2022-12-28 PROCEDURE — 3288F FALL RISK ASSESSMENT DOCD: CPT | Mod: CPTII,,, | Performed by: FAMILY MEDICINE

## 2022-12-28 NOTE — PROGRESS NOTES
Patient ID: [id number]     Chief Complaint: Establish Care (6 week follow up), restless legs, and increase hands shake         HPI:     Estephania Herrera is a 72 y.o. female here today for Establish Care (6 week follow up), restless legs, and increase hands shake . Symptoms started getting more noticeable since starting Cymbalta. Also interested in stopping the gabapentin as it may be causing her memory problems.       ----------------------------  Acute respiratory failure due to COVID-19  Chronic atrial fibrillation  Chronic back pain  Edema of lower extremity  Endometrial mass  Family history of bladder cancer      Comment:  brother  Family history of breast cancer in mother  Family history of breast cancer in sister  Family history of colon cancer in mother  Hair loss  History of falling  History of pressure ulcer  HLD (hyperlipidemia)  HTN (hypertension)  Hypothyroidism, unspecified  Insomnia  Left foot drop  Mitral regurgitation  Moderate aortic regurgitation  DELANEY (obstructive sleep apnea)  DELANEY on CPAP  Osteoporosis  PAC (premature atrial contraction)  Pneumonia due to COVID-19 virus  Pulmonic valve regurgitation  PVC (premature ventricular contraction)  Sciatica  SVT (supraventricular tachycardia)  Tricuspid regurgitation  Vitamin D deficiency     Past Surgical History:   Procedure Laterality Date    ANKLE SURGERY      CATARACT EXTRACTION  10/27/2022    Dr Avis Pastor    CATARACT EXTRACTION  2022     SECTION  1971    Dr Albina Rendon     SECTION  1973    Dr Jeremias Dixon    FRACTURE SURGERY  1965    HYSTEROSCOPY WITH DILATION AND CURETTAGE OF UTERUS  2017    Dr Galdino Del Angel    TOTAL ABDOMINAL HYSTERECTOMY W/ BILATERAL SALPINGOOPHORECTOMY Bilateral 2017    Dr Galdino Del Angel    TUBAL LIGATION  1980       Review of patient's allergies indicates:  No Known Allergies    Outpatient Medications Marked as Taking for the 22 encounter (Office Visit) with Keith Briones DO    Medication Sig Dispense Refill    atorvastatin (LIPITOR) 10 MG tablet Take 1 tablet (10 mg total) by mouth once daily. 90 tablet 3    cholecalciferol, vitamin D3, (VITAMIN D3) 50 mcg (2,000 unit) Cap capsule Take 2,000 Units by mouth once daily.      diltiaZEM (CARDIZEM CD) 180 MG 24 hr capsule Take 1 capsule by mouth once daily.      ELIQUIS 5 mg Tab Take 5 mg by mouth 2 (two) times daily.      gabapentin (NEURONTIN) 600 MG tablet Take 600 mg by mouth 3 (three) times daily.      levothyroxine (SYNTHROID) 75 MCG tablet Take 75 mcg by mouth before breakfast.      melatonin 10 mg Tab Take 1 tablet by mouth nightly as needed for Insomnia.      UNABLE TO FIND Take 1 tablet by mouth 2 (two) times a day. NourishVita      [DISCONTINUED] DULoxetine (CYMBALTA) 30 MG capsule TAKE ONE CAPSULE BY MOUTH EVERY DAY 30 capsule 5       Social History     Socioeconomic History    Marital status:    Tobacco Use    Smoking status: Former     Packs/day: 1.00     Years: 5.00     Pack years: 5.00     Types: Cigarettes    Smokeless tobacco: Never   Substance and Sexual Activity    Alcohol use: Never    Drug use: Never    Sexual activity: Not Currently     Birth control/protection: See Surgical Hx, Post-menopausal        Family History   Problem Relation Age of Onset    Breast cancer Mother         s/p R mastectomy    Coronary artery disease Mother         CABGx4 , PTCA w/ stents x2    Hypertension Mother     Colon cancer Mother         s/p colostomy/w colon resection    Bradycardia Mother     Hepatitis Mother         Chronic Hepatitis    Atrial fibrillation Mother     Pacemaker/defibrilator Mother     Arthritis Father     Hearing loss Father     Coronary artery disease Father         PTCA w/ stent    Hypertension Father     Ulcers Father 93        Perforated gastric ulcer - Cause of death    Aortic aneurysm Father         AAA    Breast cancer Sister     Bladder Cancer Brother     Coronary artery disease Brother         PTCA     "Hypertension Brother     Heart attack Brother         Patient Care Team:  Javon Tong MD as PCP - General (Family Medicine)  Carl Bustos MD as Consulting Physician (Otolaryngology)  Steve Tamez MD as Consulting Physician (Cardiology)  Avis Pastor MD (Ophthalmology)     Subjective:     Review of Systems   Constitutional:  Negative for chills and fever.   Respiratory:  Negative for shortness of breath.    Cardiovascular:  Negative for chest pain.   Musculoskeletal:  Positive for back pain and myalgias. Negative for falls.   Neurological:  Positive for tremors. Negative for dizziness and headaches.     See HPI for details  All Other ROS: Negative except as stated in HPI.       Objective:     /80 (BP Location: Left arm, Patient Position: Sitting, BP Method: Large (Automatic))   Pulse 68   Temp 98.6 °F (37 °C)   Resp 20   Ht 5' 6" (1.676 m)   Wt 124.9 kg (275 lb 6.4 oz)   SpO2 97%   BMI 44.45 kg/m²     Physical Exam  Vitals reviewed.   Constitutional:       General: She is not in acute distress.     Appearance: Normal appearance.   Cardiovascular:      Rate and Rhythm: Normal rate and regular rhythm.      Heart sounds: No murmur heard.    No friction rub. No gallop.   Pulmonary:      Effort: No respiratory distress.      Breath sounds: No wheezing, rhonchi or rales.   Musculoskeletal:         General: No swelling, tenderness or deformity.      Right lower leg: No edema.      Left lower leg: No edema.   Skin:     General: Skin is warm and dry.      Findings: No lesion or rash.   Neurological:      General: No focal deficit present.      Mental Status: She is alert.      Cranial Nerves: Cranial nerves 2-12 are intact.      Motor: Weakness and tremor present.      Comments: 4/5 left foot dorsiflexion. 5/5 on right foot.   5/5 motor strength bilateral upper extremities     Psychiatric:         Mood and Affect: Mood normal.       Assessment/Plan:     1. Essential tremor  -     Ambulatory " referral/consult to Neurology; Future; Expected date: 12/28/2022    2. Sciatica, unspecified laterality     Will taper patient off of cymbalta and gabapentin to see if tremors and memory problems improve and if pain does not get worse.      Follow up:     Follow up in about 4 months (around 4/28/2023) for Follow up. In addition to their scheduled follow up, the patient has also been instructed to follow up on as needed basis.

## 2023-01-18 DIAGNOSIS — E03.1 CONGENITAL HYPOTHYROIDISM WITHOUT GOITER: Primary | ICD-10-CM

## 2023-01-18 RX ORDER — LEVOTHYROXINE SODIUM 75 UG/1
75 TABLET ORAL
Qty: 90 TABLET | Refills: 1 | Status: SHIPPED | OUTPATIENT
Start: 2023-01-18 | End: 2023-05-08

## 2023-04-19 ENCOUNTER — OFFICE VISIT (OUTPATIENT)
Dept: NEUROLOGY | Facility: CLINIC | Age: 73
End: 2023-04-19
Payer: MEDICARE

## 2023-04-19 VITALS
WEIGHT: 275 LBS | SYSTOLIC BLOOD PRESSURE: 120 MMHG | HEIGHT: 66 IN | DIASTOLIC BLOOD PRESSURE: 60 MMHG | BODY MASS INDEX: 44.2 KG/M2

## 2023-04-19 DIAGNOSIS — U09.9 LONG COVID: Primary | ICD-10-CM

## 2023-04-19 DIAGNOSIS — G25.0 ESSENTIAL TREMOR: ICD-10-CM

## 2023-04-19 PROCEDURE — 3074F SYST BP LT 130 MM HG: CPT | Mod: CPTII,S$GLB,, | Performed by: PSYCHIATRY & NEUROLOGY

## 2023-04-19 PROCEDURE — 1101F PT FALLS ASSESS-DOCD LE1/YR: CPT | Mod: CPTII,S$GLB,, | Performed by: PSYCHIATRY & NEUROLOGY

## 2023-04-19 PROCEDURE — 99999 PR PBB SHADOW E&M-EST. PATIENT-LVL IV: ICD-10-PCS | Mod: PBBFAC,,, | Performed by: PSYCHIATRY & NEUROLOGY

## 2023-04-19 PROCEDURE — 99205 OFFICE O/P NEW HI 60 MIN: CPT | Mod: S$GLB,,, | Performed by: PSYCHIATRY & NEUROLOGY

## 2023-04-19 PROCEDURE — 3008F PR BODY MASS INDEX (BMI) DOCUMENTED: ICD-10-PCS | Mod: CPTII,S$GLB,, | Performed by: PSYCHIATRY & NEUROLOGY

## 2023-04-19 PROCEDURE — 99999 PR PBB SHADOW E&M-EST. PATIENT-LVL IV: CPT | Mod: PBBFAC,,, | Performed by: PSYCHIATRY & NEUROLOGY

## 2023-04-19 PROCEDURE — 3008F BODY MASS INDEX DOCD: CPT | Mod: CPTII,S$GLB,, | Performed by: PSYCHIATRY & NEUROLOGY

## 2023-04-19 PROCEDURE — 99205 PR OFFICE/OUTPT VISIT, NEW, LEVL V, 60-74 MIN: ICD-10-PCS | Mod: S$GLB,,, | Performed by: PSYCHIATRY & NEUROLOGY

## 2023-04-19 PROCEDURE — 3078F PR MOST RECENT DIASTOLIC BLOOD PRESSURE < 80 MM HG: ICD-10-PCS | Mod: CPTII,S$GLB,, | Performed by: PSYCHIATRY & NEUROLOGY

## 2023-04-19 PROCEDURE — 3288F PR FALLS RISK ASSESSMENT DOCUMENTED: ICD-10-PCS | Mod: CPTII,S$GLB,, | Performed by: PSYCHIATRY & NEUROLOGY

## 2023-04-19 PROCEDURE — 1159F MED LIST DOCD IN RCRD: CPT | Mod: CPTII,S$GLB,, | Performed by: PSYCHIATRY & NEUROLOGY

## 2023-04-19 PROCEDURE — 3078F DIAST BP <80 MM HG: CPT | Mod: CPTII,S$GLB,, | Performed by: PSYCHIATRY & NEUROLOGY

## 2023-04-19 PROCEDURE — 1125F AMNT PAIN NOTED PAIN PRSNT: CPT | Mod: CPTII,S$GLB,, | Performed by: PSYCHIATRY & NEUROLOGY

## 2023-04-19 PROCEDURE — 1159F PR MEDICATION LIST DOCUMENTED IN MEDICAL RECORD: ICD-10-PCS | Mod: CPTII,S$GLB,, | Performed by: PSYCHIATRY & NEUROLOGY

## 2023-04-19 PROCEDURE — 3288F FALL RISK ASSESSMENT DOCD: CPT | Mod: CPTII,S$GLB,, | Performed by: PSYCHIATRY & NEUROLOGY

## 2023-04-19 PROCEDURE — 1101F PR PT FALLS ASSESS DOC 0-1 FALLS W/OUT INJ PAST YR: ICD-10-PCS | Mod: CPTII,S$GLB,, | Performed by: PSYCHIATRY & NEUROLOGY

## 2023-04-19 PROCEDURE — 1125F PR PAIN SEVERITY QUANTIFIED, PAIN PRESENT: ICD-10-PCS | Mod: CPTII,S$GLB,, | Performed by: PSYCHIATRY & NEUROLOGY

## 2023-04-19 PROCEDURE — 3074F PR MOST RECENT SYSTOLIC BLOOD PRESSURE < 130 MM HG: ICD-10-PCS | Mod: CPTII,S$GLB,, | Performed by: PSYCHIATRY & NEUROLOGY

## 2023-04-19 RX ORDER — LANOLIN ALCOHOL/MO/W.PET/CERES
400 CREAM (GRAM) TOPICAL 2 TIMES DAILY
COMMUNITY
End: 2023-07-19

## 2023-04-19 RX ORDER — MEMANTINE HYDROCHLORIDE 5 MG/1
5 TABLET ORAL 2 TIMES DAILY
Qty: 60 TABLET | Refills: 11 | Status: SHIPPED | OUTPATIENT
Start: 2023-04-19 | End: 2023-07-19 | Stop reason: SDUPTHER

## 2023-04-19 NOTE — PROGRESS NOTES
Subjective     Patient ID: Estephania Herrera is a 73 y.o. female.    Chief Complaint: Essential Tremor (New Pt - Referred by Dr Francesca Briones - Neuro Consult - Essential Tremor)    HPI  1 year of head/UE postural tremor  Does not notice it all the time  Mild writing difficulty  Had covid; severe; was intubated x 2 weeks in late 2021 and required long rehab stay; R foot drop, resolving; persistent memory loss/word finding difficulty  Review of Systems  The remainder of the 14 system ROS is noncontributory or negative unless mentioned/reviewed above.       Objective     Physical Exam  Mild head tremor  Mental Status: Alert and oriented x3. Language is fluent with good comprehension.    Cranial Nerve: Pupils are equal, round, and reactive to light. Visual fields are intact to confrontation. Normal fundi. Ocular movements are intact. Face is symmetric at rest and with activation with intact sensation throughout. Hearing intact to finger rub bilaterally. Muscles of tongue and palate activate symmetrically. No dysarthria. Strength is full in sternocleidomastoid and trapezius bilaterally.    Motor: Muscle bulk and tone are normal. Strength is 5/5 in all four extremities both proximally and distally. Intact fine motor movements bilaterally. There is no pronator drift or satelliting on arm roll.    Sensory: Sensation is intact to light touch, pinprick, vibration, and proprioception throughout. Romberg is negative.    Reflexes: 2+ and symmetric at the biceps, triceps, brachioradialis, patella, and Achilles bilaterally. Plantar response is flexor bilaterally.    Coordination: No dysmetria on finger-nose-finger or heel-knee-shin. Normal rapid alternating movements. Fast finger tapping with normal amplitude and speed.    Gait: Narrow based with normal stride length and good arm swing bilaterally. Able to walk on heels, toes, and in tandem.       Assessment and Plan     Problem List Items Addressed This Visit          Neuro     Essential tremor     Other Visit Diagnoses       Long COVID    -  Primary    Relevant Medications    memantine (NAMENDA) 5 MG Tab            ET vs cervical dystonia; mild right now; will watch  Long covid memory impairment; trial of namenda  Chronic low back pain

## 2023-04-24 ENCOUNTER — OFFICE VISIT (OUTPATIENT)
Dept: FAMILY MEDICINE | Facility: CLINIC | Age: 73
End: 2023-04-24
Payer: MEDICARE

## 2023-04-24 VITALS
TEMPERATURE: 98 F | HEIGHT: 66 IN | HEART RATE: 61 BPM | DIASTOLIC BLOOD PRESSURE: 57 MMHG | SYSTOLIC BLOOD PRESSURE: 123 MMHG | OXYGEN SATURATION: 96 % | BODY MASS INDEX: 44.58 KG/M2 | WEIGHT: 277.38 LBS | RESPIRATION RATE: 16 BRPM

## 2023-04-24 DIAGNOSIS — I48.0 PAROXYSMAL ATRIAL FIBRILLATION: ICD-10-CM

## 2023-04-24 DIAGNOSIS — E03.9 HYPOTHYROIDISM, UNSPECIFIED TYPE: ICD-10-CM

## 2023-04-24 DIAGNOSIS — Z00.00 ROUTINE GENERAL MEDICAL EXAMINATION AT A HEALTH CARE FACILITY: Primary | ICD-10-CM

## 2023-04-24 DIAGNOSIS — G25.0 ESSENTIAL TREMOR: Primary | ICD-10-CM

## 2023-04-24 DIAGNOSIS — I48.20 CHRONIC ATRIAL FIBRILLATION: ICD-10-CM

## 2023-04-24 PROCEDURE — 99214 OFFICE O/P EST MOD 30 MIN: CPT | Mod: ,,, | Performed by: FAMILY MEDICINE

## 2023-04-24 PROCEDURE — 1160F RVW MEDS BY RX/DR IN RCRD: CPT | Mod: CPTII,,, | Performed by: FAMILY MEDICINE

## 2023-04-24 PROCEDURE — 1101F PR PT FALLS ASSESS DOC 0-1 FALLS W/OUT INJ PAST YR: ICD-10-PCS | Mod: CPTII,,, | Performed by: FAMILY MEDICINE

## 2023-04-24 PROCEDURE — 3008F BODY MASS INDEX DOCD: CPT | Mod: CPTII,,, | Performed by: FAMILY MEDICINE

## 2023-04-24 PROCEDURE — 1159F MED LIST DOCD IN RCRD: CPT | Mod: CPTII,,, | Performed by: FAMILY MEDICINE

## 2023-04-24 PROCEDURE — 1126F PR PAIN SEVERITY QUANTIFIED, NO PAIN PRESENT: ICD-10-PCS | Mod: CPTII,,, | Performed by: FAMILY MEDICINE

## 2023-04-24 PROCEDURE — 3074F PR MOST RECENT SYSTOLIC BLOOD PRESSURE < 130 MM HG: ICD-10-PCS | Mod: CPTII,,, | Performed by: FAMILY MEDICINE

## 2023-04-24 PROCEDURE — 3288F FALL RISK ASSESSMENT DOCD: CPT | Mod: CPTII,,, | Performed by: FAMILY MEDICINE

## 2023-04-24 PROCEDURE — 3008F PR BODY MASS INDEX (BMI) DOCUMENTED: ICD-10-PCS | Mod: CPTII,,, | Performed by: FAMILY MEDICINE

## 2023-04-24 PROCEDURE — 3288F PR FALLS RISK ASSESSMENT DOCUMENTED: ICD-10-PCS | Mod: CPTII,,, | Performed by: FAMILY MEDICINE

## 2023-04-24 PROCEDURE — 99214 PR OFFICE/OUTPT VISIT, EST, LEVL IV, 30-39 MIN: ICD-10-PCS | Mod: ,,, | Performed by: FAMILY MEDICINE

## 2023-04-24 PROCEDURE — 1159F PR MEDICATION LIST DOCUMENTED IN MEDICAL RECORD: ICD-10-PCS | Mod: CPTII,,, | Performed by: FAMILY MEDICINE

## 2023-04-24 PROCEDURE — 1101F PT FALLS ASSESS-DOCD LE1/YR: CPT | Mod: CPTII,,, | Performed by: FAMILY MEDICINE

## 2023-04-24 PROCEDURE — 1126F AMNT PAIN NOTED NONE PRSNT: CPT | Mod: CPTII,,, | Performed by: FAMILY MEDICINE

## 2023-04-24 PROCEDURE — 3078F PR MOST RECENT DIASTOLIC BLOOD PRESSURE < 80 MM HG: ICD-10-PCS | Mod: CPTII,,, | Performed by: FAMILY MEDICINE

## 2023-04-24 PROCEDURE — 3078F DIAST BP <80 MM HG: CPT | Mod: CPTII,,, | Performed by: FAMILY MEDICINE

## 2023-04-24 PROCEDURE — 1160F PR REVIEW ALL MEDS BY PRESCRIBER/CLIN PHARMACIST DOCUMENTED: ICD-10-PCS | Mod: CPTII,,, | Performed by: FAMILY MEDICINE

## 2023-04-24 PROCEDURE — 3074F SYST BP LT 130 MM HG: CPT | Mod: CPTII,,, | Performed by: FAMILY MEDICINE

## 2023-04-24 NOTE — PROGRESS NOTES
Patient ID: [id number]     Chief Complaint: Follow-up        HPI:     Estephania Herrera is a 73 y.o. female here today for Follow-up. Saw Neurology last week. Started on Namenda for post-Covid memory problems. Still on gabapentin but has stopped Duloxetine.     ----------------------------  Acute respiratory failure due to COVID-19  Chronic atrial fibrillation  Chronic back pain  Edema of lower extremity  Endometrial mass  Essential tremor  Family history of bladder cancer      Comment:  brother  Family history of breast cancer in mother  Family history of breast cancer in sister  Family history of colon cancer in mother  Hair loss  History of falling  History of pressure ulcer  HLD (hyperlipidemia)  HTN (hypertension)  Hypothyroidism, unspecified  Insomnia  Left foot drop  Mitral regurgitation  Moderate aortic regurgitation  DELANEY (obstructive sleep apnea)  DELANEY on CPAP  Osteoporosis  PAC (premature atrial contraction)  Pneumonia due to COVID-19 virus  Pulmonic valve regurgitation  PVC (premature ventricular contraction)  Sciatica  SVT (supraventricular tachycardia)  Tricuspid regurgitation  Vitamin D deficiency     Past Surgical History:   Procedure Laterality Date    ANKLE SURGERY      CATARACT EXTRACTION  10/27/2022    Dr Avis Pastor    CATARACT EXTRACTION  2022     SECTION  1971    Dr Albina Rendon     SECTION  1973    Dr Jeremias Dixon    FRACTURE SURGERY  1965    HYSTEROSCOPY WITH DILATION AND CURETTAGE OF UTERUS  2017    Dr Galdino Del Angel    TOTAL ABDOMINAL HYSTERECTOMY W/ BILATERAL SALPINGOOPHORECTOMY Bilateral 2017    Dr Galdino Del Angel    TUBAL LIGATION  1980       Review of patient's allergies indicates:  No Known Allergies    Outpatient Medications Marked as Taking for the 23 encounter (Office Visit) with Keith Briones,    Medication Sig Dispense Refill    atorvastatin (LIPITOR) 10 MG tablet Take 1 tablet (10 mg total) by mouth once daily. 90 tablet 3     cholecalciferol, vitamin D3, (VITAMIN D3) 50 mcg (2,000 unit) Cap capsule Take 2,000 Units by mouth once daily.      diltiaZEM (CARDIZEM CD) 180 MG 24 hr capsule Take 1 capsule by mouth once daily.      ELIQUIS 5 mg Tab Take 5 mg by mouth 2 (two) times daily.      gabapentin (NEURONTIN) 600 MG tablet Take 1 tablet (600 mg total) by mouth 3 (three) times daily. 270 tablet 0    levothyroxine (SYNTHROID) 75 MCG tablet Take 1 tablet (75 mcg total) by mouth before breakfast. 90 tablet 1    magnesium oxide (MAG-OX) 400 mg (241.3 mg magnesium) tablet Take 400 mg by mouth 2 (two) times daily.      melatonin 10 mg Tab Take 1 tablet by mouth nightly as needed for Insomnia.      memantine (NAMENDA) 5 MG Tab Take 1 tablet (5 mg total) by mouth 2 (two) times daily. 60 tablet 11    UNABLE TO FIND Take 1 tablet by mouth 2 (two) times a day. NourishVita         Social History     Socioeconomic History    Marital status:    Tobacco Use    Smoking status: Former     Packs/day: 1.00     Years: 5.00     Pack years: 5.00     Types: Cigarettes    Smokeless tobacco: Never   Substance and Sexual Activity    Alcohol use: Never    Drug use: Never    Sexual activity: Not Currently     Birth control/protection: See Surgical Hx, Post-menopausal     Social Determinants of Health     Financial Resource Strain: Low Risk     Difficulty of Paying Living Expenses: Not hard at all   Food Insecurity: No Food Insecurity    Worried About Running Out of Food in the Last Year: Never true    Ran Out of Food in the Last Year: Never true   Transportation Needs: No Transportation Needs    Lack of Transportation (Medical): No    Lack of Transportation (Non-Medical): No   Physical Activity: Sufficiently Active    Days of Exercise per Week: 5 days    Minutes of Exercise per Session: 30 min   Stress: No Stress Concern Present    Feeling of Stress : Not at all   Social Connections: Moderately Integrated    Frequency of Communication with Friends and Family:  More than three times a week    Frequency of Social Gatherings with Friends and Family: More than three times a week    Attends Scientologist Services: More than 4 times per year    Active Member of Clubs or Organizations: Yes    Attends Club or Organization Meetings: More than 4 times per year    Marital Status:    Housing Stability: Low Risk     Unable to Pay for Housing in the Last Year: No    Number of Places Lived in the Last Year: 1    Unstable Housing in the Last Year: No        Family History   Problem Relation Age of Onset    Breast cancer Mother         s/p R mastectomy    Coronary artery disease Mother         CABGx4 , PTCA w/ stents x2    Hypertension Mother     Colon cancer Mother         s/p colostomy/w colon resection    Bradycardia Mother     Hepatitis Mother         Chronic Hepatitis    Atrial fibrillation Mother     Pacemaker/defibrilator Mother     Arthritis Father     Hearing loss Father     Coronary artery disease Father         PTCA w/ stent    Hypertension Father     Ulcers Father 93        Perforated gastric ulcer - Cause of death    Aortic aneurysm Father         AAA    Breast cancer Sister     Bladder Cancer Brother     Coronary artery disease Brother         PTCA    Hypertension Brother     Heart attack Brother         Patient Care Team:  Keith Briones DO as PCP - General (Family Medicine)  Carl Bustos MD as Consulting Physician (Otolaryngology)  Steve Tamez MD as Consulting Physician (Cardiology)  Avis Pastor MD (Ophthalmology)  Thien Bettencourt MD as Consulting Physician (Neurology)     Subjective:     Review of Systems   Constitutional:  Negative for chills and fever.   Respiratory:  Negative for shortness of breath.    Cardiovascular:  Negative for chest pain.   Gastrointestinal:  Negative for constipation and diarrhea.   Musculoskeletal:  Positive for back pain.   Neurological:  Positive for tingling and tremors. Negative for dizziness and headaches.  "  Psychiatric/Behavioral:  The patient does not have insomnia.      See HPI for details  All Other ROS: Negative except as stated in HPI.       Objective:     BP (!) 123/57   Pulse 61   Temp 98.1 °F (36.7 °C) (Tympanic)   Resp 16   Ht 5' 5.75" (1.67 m)   Wt 125.8 kg (277 lb 6.4 oz)   SpO2 96%   BMI 45.12 kg/m²     Physical Exam  Vitals reviewed.   Constitutional:       General: She is not in acute distress.     Appearance: Normal appearance.   Cardiovascular:      Rate and Rhythm: Normal rate and regular rhythm.      Heart sounds: No murmur heard.    No friction rub. No gallop.   Pulmonary:      Effort: No respiratory distress.      Breath sounds: No wheezing, rhonchi or rales.   Musculoskeletal:         General: No swelling, tenderness or deformity.      Right lower leg: No edema.      Left lower leg: No edema.   Skin:     General: Skin is warm and dry.      Findings: No lesion or rash.   Neurological:      General: No focal deficit present.      Mental Status: She is alert.   Psychiatric:         Mood and Affect: Mood normal.       Assessment/Plan:     1. Essential tremor    2. Paroxysmal atrial fibrillation      Possible candidate for Propanolol for essential tremors. However, patient's blood pressure today is a little low, particularly diastolic and her heart rate is low-normal. Recommended she discuss starting this medication with her Cardiologist first.     Follow up:     Follow up in about 6 months (around 10/24/2023) for Medicare Wellness. In addition to their scheduled follow up, the patient has also been instructed to follow up on as needed basis.         "

## 2023-05-01 ENCOUNTER — PATIENT MESSAGE (OUTPATIENT)
Dept: ADMINISTRATIVE | Facility: HOSPITAL | Age: 73
End: 2023-05-01
Payer: MEDICARE

## 2023-05-07 DIAGNOSIS — E03.1 CONGENITAL HYPOTHYROIDISM WITHOUT GOITER: ICD-10-CM

## 2023-05-08 RX ORDER — LEVOTHYROXINE SODIUM 75 UG/1
75 TABLET ORAL
Qty: 90 TABLET | Refills: 1 | Status: SHIPPED | OUTPATIENT
Start: 2023-05-08

## 2023-06-14 DIAGNOSIS — G25.0 ESSENTIAL TREMOR: Primary | ICD-10-CM

## 2023-06-14 RX ORDER — PROPRANOLOL HYDROCHLORIDE 40 MG/1
40 TABLET ORAL 2 TIMES DAILY
Qty: 180 TABLET | Refills: 1 | Status: SHIPPED | OUTPATIENT
Start: 2023-06-14 | End: 2023-09-11

## 2023-06-27 ENCOUNTER — TELEPHONE (OUTPATIENT)
Dept: NEUROLOGY | Facility: CLINIC | Age: 73
End: 2023-06-27
Payer: MEDICARE

## 2023-06-27 NOTE — TELEPHONE ENCOUNTER
States pt needs a new prescription sent for this medication.   I notified prescription sent to [facility] in [place]  4/19/2023 with 11 refills.  States pt re enrolled with their pharmacy 6/23/23.      Medication: Memantine 5 mg    Pharmacy: [facility]/ [place], [place]     Last Appointment: 4/19/2023     Next Appointment: 7/19/2023

## 2023-07-19 ENCOUNTER — OFFICE VISIT (OUTPATIENT)
Dept: NEUROLOGY | Facility: CLINIC | Age: 73
End: 2023-07-19
Payer: MEDICARE

## 2023-07-19 VITALS
DIASTOLIC BLOOD PRESSURE: 67 MMHG | WEIGHT: 277 LBS | HEIGHT: 65 IN | RESPIRATION RATE: 18 BRPM | BODY MASS INDEX: 46.15 KG/M2 | SYSTOLIC BLOOD PRESSURE: 129 MMHG | HEART RATE: 55 BPM

## 2023-07-19 DIAGNOSIS — U09.9 LONG COVID: ICD-10-CM

## 2023-07-19 PROCEDURE — 1159F PR MEDICATION LIST DOCUMENTED IN MEDICAL RECORD: ICD-10-PCS | Mod: CPTII,S$GLB,, | Performed by: PSYCHIATRY & NEUROLOGY

## 2023-07-19 PROCEDURE — 99999 PR PBB SHADOW E&M-EST. PATIENT-LVL III: ICD-10-PCS | Mod: PBBFAC,,, | Performed by: PSYCHIATRY & NEUROLOGY

## 2023-07-19 PROCEDURE — 1101F PR PT FALLS ASSESS DOC 0-1 FALLS W/OUT INJ PAST YR: ICD-10-PCS | Mod: CPTII,S$GLB,, | Performed by: PSYCHIATRY & NEUROLOGY

## 2023-07-19 PROCEDURE — 99214 PR OFFICE/OUTPT VISIT, EST, LEVL IV, 30-39 MIN: ICD-10-PCS | Mod: S$GLB,,, | Performed by: PSYCHIATRY & NEUROLOGY

## 2023-07-19 PROCEDURE — 3288F PR FALLS RISK ASSESSMENT DOCUMENTED: ICD-10-PCS | Mod: CPTII,S$GLB,, | Performed by: PSYCHIATRY & NEUROLOGY

## 2023-07-19 PROCEDURE — 3074F PR MOST RECENT SYSTOLIC BLOOD PRESSURE < 130 MM HG: ICD-10-PCS | Mod: CPTII,S$GLB,, | Performed by: PSYCHIATRY & NEUROLOGY

## 2023-07-19 PROCEDURE — 1101F PT FALLS ASSESS-DOCD LE1/YR: CPT | Mod: CPTII,S$GLB,, | Performed by: PSYCHIATRY & NEUROLOGY

## 2023-07-19 PROCEDURE — 99999 PR PBB SHADOW E&M-EST. PATIENT-LVL III: CPT | Mod: PBBFAC,,, | Performed by: PSYCHIATRY & NEUROLOGY

## 2023-07-19 PROCEDURE — 99214 OFFICE O/P EST MOD 30 MIN: CPT | Mod: S$GLB,,, | Performed by: PSYCHIATRY & NEUROLOGY

## 2023-07-19 PROCEDURE — 3288F FALL RISK ASSESSMENT DOCD: CPT | Mod: CPTII,S$GLB,, | Performed by: PSYCHIATRY & NEUROLOGY

## 2023-07-19 PROCEDURE — 3078F DIAST BP <80 MM HG: CPT | Mod: CPTII,S$GLB,, | Performed by: PSYCHIATRY & NEUROLOGY

## 2023-07-19 PROCEDURE — 3008F BODY MASS INDEX DOCD: CPT | Mod: CPTII,S$GLB,, | Performed by: PSYCHIATRY & NEUROLOGY

## 2023-07-19 PROCEDURE — 3074F SYST BP LT 130 MM HG: CPT | Mod: CPTII,S$GLB,, | Performed by: PSYCHIATRY & NEUROLOGY

## 2023-07-19 PROCEDURE — 3078F PR MOST RECENT DIASTOLIC BLOOD PRESSURE < 80 MM HG: ICD-10-PCS | Mod: CPTII,S$GLB,, | Performed by: PSYCHIATRY & NEUROLOGY

## 2023-07-19 PROCEDURE — 1159F MED LIST DOCD IN RCRD: CPT | Mod: CPTII,S$GLB,, | Performed by: PSYCHIATRY & NEUROLOGY

## 2023-07-19 PROCEDURE — 3008F PR BODY MASS INDEX (BMI) DOCUMENTED: ICD-10-PCS | Mod: CPTII,S$GLB,, | Performed by: PSYCHIATRY & NEUROLOGY

## 2023-07-19 RX ORDER — MEMANTINE HYDROCHLORIDE 5 MG/1
5 TABLET ORAL 2 TIMES DAILY
Qty: 180 TABLET | Refills: 3 | Status: SHIPPED | OUTPATIENT
Start: 2023-07-19 | End: 2024-07-18

## 2023-07-19 NOTE — PROGRESS NOTES
Subjective     Patient ID: Estephania Herrera is a 73 y.o. female.    Chief Complaint: Follow-up (Follow up tremor and memory loss, patient feels medication is working and would like refill sent to [facility] for a 3 month supply )    HPI  Tremor mild, stable  Now on inderal instead of cardizem; not much difference    Memantine is helping the covid related short term memory issues; no side effects; wants to stay on it  Review of Systems  The remainder of the 14 system ROS is noncontributory or negative unless mentioned/reviewed above.       Objective     Physical Exam  Mild LUE and head tremor  Mental Status: Alert and oriented x3. Language is fluent with good comprehension.    Cranial Nerve: Pupils are equal, round, and reactive to light. Visual fields are intact to confrontation. Normal fundi. Ocular movements are intact. Face is symmetric at rest and with activation with intact sensation throughout. Hearing intact to finger rub bilaterally. Muscles of tongue and palate activate symmetrically. No dysarthria. Strength is full in sternocleidomastoid and trapezius bilaterally.    Motor: Muscle bulk and tone are normal. Strength is 5/5 in all four extremities both proximally and distally. Intact fine motor movements bilaterally. There is no pronator drift or satelliting on arm roll.    Sensory: Sensation is intact to light touch, pinprick, vibration, and proprioception throughout. Romberg is negative.    Reflexes: 2+ and symmetric at the biceps, triceps, brachioradialis, patella, and Achilles bilaterally. Plantar response is flexor bilaterally.    Coordination: No dysmetria on finger-nose-finger or heel-knee-shin. Normal rapid alternating movements. Fast finger tapping with normal amplitude and speed.    Gait: Narrow based with normal stride length and good arm swing bilaterally. Able to walk on heels, toes, and in tandem.       Assessment and Plan     1. Long COVID  -     memantine (NAMENDA) 5 MG Tab; Take 1  tablet (5 mg total) by mouth 2 (two) times daily.  Dispense: 180 tablet; Refill: 3        Inderal  memantine         Follow up in about 1 year (around 7/19/2024).

## 2023-07-24 ENCOUNTER — PATIENT MESSAGE (OUTPATIENT)
Dept: ADMINISTRATIVE | Facility: HOSPITAL | Age: 73
End: 2023-07-24
Payer: MEDICARE

## 2023-08-29 ENCOUNTER — PATIENT MESSAGE (OUTPATIENT)
Dept: ADMINISTRATIVE | Facility: HOSPITAL | Age: 73
End: 2023-08-29
Payer: MEDICARE

## 2023-09-03 DIAGNOSIS — G25.0 ESSENTIAL TREMOR: ICD-10-CM

## 2023-09-05 RX ORDER — GABAPENTIN 600 MG/1
600 TABLET ORAL 3 TIMES DAILY
Qty: 270 TABLET | Refills: 0 | Status: SHIPPED | OUTPATIENT
Start: 2023-09-05

## 2023-09-10 DIAGNOSIS — G25.0 ESSENTIAL TREMOR: ICD-10-CM

## 2023-09-11 RX ORDER — PROPRANOLOL HYDROCHLORIDE 40 MG/1
40 TABLET ORAL 2 TIMES DAILY
Qty: 180 TABLET | Refills: 1 | Status: SHIPPED | OUTPATIENT
Start: 2023-09-11

## 2023-10-26 ENCOUNTER — LAB VISIT (OUTPATIENT)
Dept: LAB | Facility: HOSPITAL | Age: 73
End: 2023-10-26
Attending: FAMILY MEDICINE
Payer: MEDICARE

## 2023-10-26 DIAGNOSIS — I48.20 CHRONIC ATRIAL FIBRILLATION: ICD-10-CM

## 2023-10-26 DIAGNOSIS — Z00.00 ROUTINE GENERAL MEDICAL EXAMINATION AT A HEALTH CARE FACILITY: ICD-10-CM

## 2023-10-26 DIAGNOSIS — E03.9 HYPOTHYROIDISM, UNSPECIFIED TYPE: ICD-10-CM

## 2023-10-26 LAB
ALBUMIN SERPL-MCNC: 3.7 G/DL (ref 3.4–4.8)
ALBUMIN/GLOB SERPL: 1.2 RATIO (ref 1.1–2)
ALP SERPL-CCNC: 56 UNIT/L (ref 40–150)
ALT SERPL-CCNC: 11 UNIT/L (ref 0–55)
AST SERPL-CCNC: 16 UNIT/L (ref 5–34)
BASOPHILS # BLD AUTO: 0.07 X10(3)/MCL
BASOPHILS NFR BLD AUTO: 1.2 %
BILIRUB SERPL-MCNC: 0.6 MG/DL
BUN SERPL-MCNC: 15 MG/DL (ref 9.8–20.1)
CALCIUM SERPL-MCNC: 9.2 MG/DL (ref 8.4–10.2)
CHLORIDE SERPL-SCNC: 113 MMOL/L (ref 98–107)
CHOLEST SERPL-MCNC: 219 MG/DL
CHOLEST/HDLC SERPL: 4 {RATIO} (ref 0–5)
CO2 SERPL-SCNC: 22 MMOL/L (ref 23–31)
CREAT SERPL-MCNC: 0.74 MG/DL (ref 0.55–1.02)
EOSINOPHIL # BLD AUTO: 0.21 X10(3)/MCL (ref 0–0.9)
EOSINOPHIL NFR BLD AUTO: 3.5 %
ERYTHROCYTE [DISTWIDTH] IN BLOOD BY AUTOMATED COUNT: 15.1 % (ref 11.5–17)
GFR SERPLBLD CREATININE-BSD FMLA CKD-EPI: >60 MLS/MIN/1.73/M2
GLOBULIN SER-MCNC: 3.1 GM/DL (ref 2.4–3.5)
GLUCOSE SERPL-MCNC: 110 MG/DL (ref 82–115)
HCT VFR BLD AUTO: 41 % (ref 37–47)
HDLC SERPL-MCNC: 50 MG/DL (ref 35–60)
HGB BLD-MCNC: 12.9 G/DL (ref 12–16)
IMM GRANULOCYTES # BLD AUTO: 0.02 X10(3)/MCL (ref 0–0.04)
IMM GRANULOCYTES NFR BLD AUTO: 0.3 %
LDLC SERPL CALC-MCNC: 130 MG/DL (ref 50–140)
LYMPHOCYTES # BLD AUTO: 1.98 X10(3)/MCL (ref 0.6–4.6)
LYMPHOCYTES NFR BLD AUTO: 33.1 %
MCH RBC QN AUTO: 29.4 PG (ref 27–31)
MCHC RBC AUTO-ENTMCNC: 31.5 G/DL (ref 33–36)
MCV RBC AUTO: 93.4 FL (ref 80–94)
MONOCYTES # BLD AUTO: 0.65 X10(3)/MCL (ref 0.1–1.3)
MONOCYTES NFR BLD AUTO: 10.9 %
NEUTROPHILS # BLD AUTO: 3.05 X10(3)/MCL (ref 2.1–9.2)
NEUTROPHILS NFR BLD AUTO: 51 %
NRBC BLD AUTO-RTO: 0 %
PLATELET # BLD AUTO: 261 X10(3)/MCL (ref 130–400)
PMV BLD AUTO: 12.3 FL (ref 7.4–10.4)
POTASSIUM SERPL-SCNC: 4.1 MMOL/L (ref 3.5–5.1)
PROT SERPL-MCNC: 6.8 GM/DL (ref 5.8–7.6)
RBC # BLD AUTO: 4.39 X10(6)/MCL (ref 4.2–5.4)
SODIUM SERPL-SCNC: 143 MMOL/L (ref 136–145)
T4 FREE SERPL-MCNC: 1.17 NG/DL (ref 0.7–1.48)
TRIGL SERPL-MCNC: 197 MG/DL (ref 37–140)
TSH SERPL-ACNC: 2.12 UIU/ML (ref 0.35–4.94)
VLDLC SERPL CALC-MCNC: 39 MG/DL
WBC # SPEC AUTO: 5.98 X10(3)/MCL (ref 4.5–11.5)

## 2023-10-26 PROCEDURE — 85025 COMPLETE CBC W/AUTO DIFF WBC: CPT

## 2023-10-26 PROCEDURE — 84439 ASSAY OF FREE THYROXINE: CPT

## 2023-10-26 PROCEDURE — 80061 LIPID PANEL: CPT

## 2023-10-26 PROCEDURE — 84443 ASSAY THYROID STIM HORMONE: CPT

## 2023-10-26 PROCEDURE — 36415 COLL VENOUS BLD VENIPUNCTURE: CPT

## 2023-10-26 PROCEDURE — 80053 COMPREHEN METABOLIC PANEL: CPT

## 2023-10-31 ENCOUNTER — OFFICE VISIT (OUTPATIENT)
Dept: FAMILY MEDICINE | Facility: CLINIC | Age: 73
End: 2023-10-31
Payer: MEDICARE

## 2023-10-31 VITALS
DIASTOLIC BLOOD PRESSURE: 73 MMHG | HEIGHT: 65 IN | WEIGHT: 280 LBS | SYSTOLIC BLOOD PRESSURE: 137 MMHG | TEMPERATURE: 98 F | OXYGEN SATURATION: 97 % | RESPIRATION RATE: 18 BRPM | BODY MASS INDEX: 46.65 KG/M2 | HEART RATE: 56 BPM

## 2023-10-31 DIAGNOSIS — Z00.00 ROUTINE GENERAL MEDICAL EXAMINATION AT A HEALTH CARE FACILITY: Primary | ICD-10-CM

## 2023-10-31 DIAGNOSIS — E66.01 MORBID OBESITY: ICD-10-CM

## 2023-10-31 DIAGNOSIS — Z12.31 BREAST CANCER SCREENING BY MAMMOGRAM: ICD-10-CM

## 2023-10-31 DIAGNOSIS — F03.90 DEMENTIA, UNSPECIFIED DEMENTIA SEVERITY, UNSPECIFIED DEMENTIA TYPE, UNSPECIFIED WHETHER BEHAVIORAL, PSYCHOTIC, OR MOOD DISTURBANCE OR ANXIETY: ICD-10-CM

## 2023-10-31 DIAGNOSIS — E78.2 MIXED HYPERLIPIDEMIA: ICD-10-CM

## 2023-10-31 DIAGNOSIS — M81.0 AGE-RELATED OSTEOPOROSIS WITHOUT CURRENT PATHOLOGICAL FRACTURE: ICD-10-CM

## 2023-10-31 DIAGNOSIS — Z12.11 SCREENING FOR MALIGNANT NEOPLASM OF COLON: ICD-10-CM

## 2023-10-31 PROCEDURE — 1159F MED LIST DOCD IN RCRD: CPT | Mod: CPTII,,, | Performed by: FAMILY MEDICINE

## 2023-10-31 PROCEDURE — 3078F PR MOST RECENT DIASTOLIC BLOOD PRESSURE < 80 MM HG: ICD-10-PCS | Mod: CPTII,,, | Performed by: FAMILY MEDICINE

## 2023-10-31 PROCEDURE — 1160F RVW MEDS BY RX/DR IN RCRD: CPT | Mod: CPTII,,, | Performed by: FAMILY MEDICINE

## 2023-10-31 PROCEDURE — 1101F PT FALLS ASSESS-DOCD LE1/YR: CPT | Mod: CPTII,,, | Performed by: FAMILY MEDICINE

## 2023-10-31 PROCEDURE — 1126F AMNT PAIN NOTED NONE PRSNT: CPT | Mod: CPTII,,, | Performed by: FAMILY MEDICINE

## 2023-10-31 PROCEDURE — 3078F DIAST BP <80 MM HG: CPT | Mod: CPTII,,, | Performed by: FAMILY MEDICINE

## 2023-10-31 PROCEDURE — 3288F PR FALLS RISK ASSESSMENT DOCUMENTED: ICD-10-PCS | Mod: CPTII,,, | Performed by: FAMILY MEDICINE

## 2023-10-31 PROCEDURE — 1159F PR MEDICATION LIST DOCUMENTED IN MEDICAL RECORD: ICD-10-PCS | Mod: CPTII,,, | Performed by: FAMILY MEDICINE

## 2023-10-31 PROCEDURE — 1160F PR REVIEW ALL MEDS BY PRESCRIBER/CLIN PHARMACIST DOCUMENTED: ICD-10-PCS | Mod: CPTII,,, | Performed by: FAMILY MEDICINE

## 2023-10-31 PROCEDURE — G0439 PPPS, SUBSEQ VISIT: HCPCS | Mod: ,,, | Performed by: FAMILY MEDICINE

## 2023-10-31 PROCEDURE — G0439 PR MEDICARE ANNUAL WELLNESS SUBSEQUENT VISIT: ICD-10-PCS | Mod: ,,, | Performed by: FAMILY MEDICINE

## 2023-10-31 PROCEDURE — 1126F PR PAIN SEVERITY QUANTIFIED, NO PAIN PRESENT: ICD-10-PCS | Mod: CPTII,,, | Performed by: FAMILY MEDICINE

## 2023-10-31 PROCEDURE — 3288F FALL RISK ASSESSMENT DOCD: CPT | Mod: CPTII,,, | Performed by: FAMILY MEDICINE

## 2023-10-31 PROCEDURE — 1101F PR PT FALLS ASSESS DOC 0-1 FALLS W/OUT INJ PAST YR: ICD-10-PCS | Mod: CPTII,,, | Performed by: FAMILY MEDICINE

## 2023-10-31 PROCEDURE — 3075F PR MOST RECENT SYSTOLIC BLOOD PRESS GE 130-139MM HG: ICD-10-PCS | Mod: CPTII,,, | Performed by: FAMILY MEDICINE

## 2023-10-31 PROCEDURE — 3075F SYST BP GE 130 - 139MM HG: CPT | Mod: CPTII,,, | Performed by: FAMILY MEDICINE

## 2023-10-31 RX ORDER — ATORVASTATIN CALCIUM 10 MG/1
10 TABLET, FILM COATED ORAL DAILY
Qty: 90 TABLET | Refills: 3 | Status: SHIPPED | OUTPATIENT
Start: 2023-10-31 | End: 2024-10-30

## 2023-10-31 NOTE — PROGRESS NOTES
Patient ID: [id number]     Chief Complaint: Medicare AWV    HPI:     Estephania Herrera is a 73 y.o. female here today for a Medicare Wellness. No other complaints today. Reviewed recent labs with patient.     ----------------------------  Acute respiratory failure due to COVID-19  Chronic atrial fibrillation  Chronic back pain  Edema of lower extremity  Endometrial mass  Essential tremor  Family history of bladder cancer      Comment:  brother  Family history of breast cancer in mother  Family history of breast cancer in sister  Family history of colon cancer in mother  Hair loss  History of falling  History of pressure ulcer  HLD (hyperlipidemia)  HTN (hypertension)  Hypothyroidism, unspecified  Insomnia  Left foot drop  Mitral regurgitation  Moderate aortic regurgitation  DELANEY (obstructive sleep apnea)  DELANEY on CPAP  Osteoporosis  PAC (premature atrial contraction)  Pneumonia due to COVID-19 virus  Pulmonic valve regurgitation  PVC (premature ventricular contraction)  Sciatica  SVT (supraventricular tachycardia)  Tricuspid regurgitation  Vitamin D deficiency     Past Surgical History:   Procedure Laterality Date    ANKLE SURGERY      CATARACT EXTRACTION  10/27/2022    Dr Avis Pastor    CATARACT EXTRACTION  2022     SECTION  1971    Dr Albina Rendon     SECTION  1973    Dr Jeremias Dixon    FRACTURE SURGERY  1965    HYSTEROSCOPY WITH DILATION AND CURETTAGE OF UTERUS  2017    Dr Galdino Del Angel    TOTAL ABDOMINAL HYSTERECTOMY W/ BILATERAL SALPINGOOPHORECTOMY Bilateral 2017    Dr Galdino Del Angel    TUBAL LIGATION  1980       Review of patient's allergies indicates:  No Known Allergies    Outpatient Medications Marked as Taking for the 10/31/23 encounter (Office Visit) with Keith Briones,    Medication Sig Dispense Refill    ELIQUIS 5 mg Tab Take 5 mg by mouth 2 (two) times daily.      gabapentin (NEURONTIN) 600 MG tablet TAKE 1 TABLET THREE TIMES DAILY 270 tablet 0    levothyroxine  (SYNTHROID) 75 MCG tablet TAKE 1 TABLET (75 MCG TOTAL) BY MOUTH BEFORE BREAKFAST. 90 tablet 1    melatonin 10 mg Tab Take 1 tablet by mouth nightly as needed for Insomnia.      memantine (NAMENDA) 5 MG Tab Take 1 tablet (5 mg total) by mouth 2 (two) times daily. 180 tablet 3    propranoloL (INDERAL) 40 MG tablet TAKE 1 TABLET TWICE DAILY 180 tablet 1       Social History     Socioeconomic History    Marital status:    Tobacco Use    Smoking status: Former     Current packs/day: 1.00     Average packs/day: 1 pack/day for 5.0 years (5.0 ttl pk-yrs)     Types: Cigarettes    Smokeless tobacco: Never   Substance and Sexual Activity    Alcohol use: Never    Drug use: Never    Sexual activity: Not Currently     Birth control/protection: See Surgical Hx, Post-menopausal     Social Determinants of Health     Financial Resource Strain: Low Risk  (4/24/2023)    Overall Financial Resource Strain (CARDIA)     Difficulty of Paying Living Expenses: Not hard at all   Food Insecurity: No Food Insecurity (4/24/2023)    Hunger Vital Sign     Worried About Running Out of Food in the Last Year: Never true     Ran Out of Food in the Last Year: Never true   Transportation Needs: No Transportation Needs (4/24/2023)    PRAPARE - Transportation     Lack of Transportation (Medical): No     Lack of Transportation (Non-Medical): No   Physical Activity: Sufficiently Active (4/24/2023)    Exercise Vital Sign     Days of Exercise per Week: 5 days     Minutes of Exercise per Session: 30 min   Stress: No Stress Concern Present (4/24/2023)    [employer] [employer] - Occupational Stress Questionnaire     Feeling of Stress : Not at all   Social Connections: Moderately Integrated (4/24/2023)    Social Connection and Isolation Panel [NHANES]     Frequency of Communication with Friends and Family: More than three times a week     Frequency of Social Gatherings with Friends and Family: More than three times a week     Attends  Christianity Services: More than 4 times per year     Active Member of Clubs or Organizations: Yes     Attends Club or Organization Meetings: More than 4 times per year     Marital Status:    Housing Stability: Low Risk  (4/24/2023)    Housing Stability Vital Sign     Unable to Pay for Housing in the Last Year: No     Number of Places Lived in the Last Year: 1     Unstable Housing in the Last Year: No        Family History   Problem Relation Age of Onset    Breast cancer Mother         s/p R mastectomy    Coronary artery disease Mother         CABGx4 , PTCA w/ stents x2    Hypertension Mother     Colon cancer Mother         s/p colostomy/w colon resection    Bradycardia Mother     Hepatitis Mother         Chronic Hepatitis    Atrial fibrillation Mother     Pacemaker/defibrilator Mother     Arthritis Father     Hearing loss Father     Coronary artery disease Father         PTCA w/ stent    Hypertension Father     Ulcers Father 93        Perforated gastric ulcer - Cause of death    Aortic aneurysm Father         AAA    Breast cancer Sister     Bladder Cancer Brother     Coronary artery disease Brother         PTCA    Hypertension Brother     Heart attack Brother         Patient Care Team:  Keith Briones DO as PCP - General (Family Medicine)  Carl Bustos MD as Consulting Physician (Otolaryngology)  Steve Tamez MD as Consulting Physician (Cardiology)  Avis Pastor MD (Ophthalmology)  Thien Bettencourt MD as Consulting Physician (Neurology)  Paul Kramer FNP as Nurse Practitioner (Cardiology)       Subjective:     Review of Systems   Constitutional:  Negative for chills and fever.   Respiratory:  Negative for shortness of breath.    Cardiovascular:  Negative for chest pain.   Gastrointestinal:  Negative for constipation and diarrhea.   Musculoskeletal:  Negative for falls.   Neurological:  Negative for dizziness and headaches.   Psychiatric/Behavioral:  The patient does not have insomnia.       Patient Reported Health Risk Assessments:  What [age] 70-79  Are you male or female?: Female  During the past four weeks, how much have you been bothered by emotional problems such as feeling anxious, depressed, irritable, sad, or downhearted and blue?: Not at all  During the past five weeks, has your physical and/or emotional health limited your social activities with family, friends, neighbors, or groups?: Not at all  During the past four weeks, how much bodily pain have you generally had?: Mild pain  During the past four weeks, was someone available to help if you needed and wanted help?: Yes, as much as I wanted  During the past four weeks, what was the hardest physical activity you could do for at least two minutes?: Moderate  Can you get to places out of walking distance without help?  (For example, can you travel alone on buses or taxis, or drive your own car?): Yes  Can you go shopping for groceries or clothes without someone's help?: Yes  Can you prepare your own meals?: Yes  Can you do your own housework without help?: Yes  Because of any health problems, do you need the help of another person with your personal care needs such as eating, bathing, dressing, or getting around the house?: No  Can you handle your own money without help?: Yes  During the past four weeks, how would you rate your health in general?: Very good  How have things been going for you during the past four weeks?: Pretty well  Are you having difficulties driving your car?: No  Do you always fasten your seat belt when you are in a car?: Yes, usually  How often in the past four weeks have you been bothered by falling or dizzy when standing up?: Never  How often in the past four weeks have you been bothered by sexual problems?: Never  How often in the past four weeks have you been bothered by trouble eating well?: Never  How often in the past four weeks have you been bothered by teeth or denture problems?: Never  How often in  "the past four weeks have you been bothered with problems using the telephone?: Never  How often in the past four weeks have you been bothered by tiredness or fatigue?: Seldom  Have you fallen two or more times in the past year?: No  Are you afraid of falling?: No  Are you a smoker?: No  During the past four weeks, how many drinks of wine, beer, or other alcoholic beverages did you have?: No alcohol at all  Do you exercise for about 20 minutes three or more days a week?: Yes, most of the time  Have you been given any information to help you with hazards in your house that might hurt you?: Yes  Have you been given any information to help you with keeping track of your medications?: Yes  How often do you have trouble taking medicines the way you've been told to take them?: I always take them as prescribed  How confident are you that you can control and manage most of your health problems?: Very confident  What is your race? (Check all that apply.):     Objective:     /73   Pulse (!) 56   Temp 97.9 °F (36.6 °C) (Tympanic)   Resp 18   Ht 5' 4.96" (1.65 m)   Wt 127 kg (280 lb)   SpO2 97%   BMI 46.65 kg/m²     Physical Exam  Vitals reviewed.   Constitutional:       General: She is not in acute distress.     Appearance: Normal appearance.   Cardiovascular:      Rate and Rhythm: Normal rate and regular rhythm.      Heart sounds: No murmur heard.     No friction rub. No gallop.   Pulmonary:      Effort: No respiratory distress.      Breath sounds: No wheezing, rhonchi or rales.   Musculoskeletal:         General: No swelling, tenderness or deformity.      Right lower leg: No edema.      Left lower leg: No edema.   Skin:     General: Skin is warm and dry.      Findings: No lesion or rash.   Neurological:      General: No focal deficit present.      Mental Status: She is alert.   Psychiatric:         Mood and Affect: Mood normal.         Assessment:       ICD-10-CM ICD-9-CM   1. Routine general medical " examination at a health care facility  Z00.00 V70.0   2. Dementia, unspecified dementia severity, unspecified dementia type, unspecified whether behavioral, psychotic, or mood disturbance or anxiety  F03.90 294.20   3. Morbid obesity  E66.01 278.01   4. Mixed hyperlipidemia  E78.2 272.2   5. Breast cancer screening by mammogram  Z12.31 V76.12   6. Age-related osteoporosis without current pathological fracture  M81.0 733.01        Plan:     Medicare Annual Wellness and Personalized Prevention Plan:     Fall Risk + Home Safety + Living Situation + Whisper Test + Depression Screen + CAGE + Cognitive Impairment Screen + ADL Screen + Timed Get Up and Go + Nutrition Screen + PAQ Screen + Health Risk Assessment all reviewed.          No data to display                  10/31/2023     1:00 PM 7/19/2023     9:45 AM 4/24/2023     2:15 PM 4/19/2023     9:15 AM 12/28/2022     2:45 PM 11/14/2022     2:30 PM   Fall Risk Assessment - Outpatient   Mobility Status Ambulatory Ambulatory Ambulatory Ambulatory Ambulatory Ambulatory   Number of falls 0 0 0 1 0 0   Identified as fall risk False False False False False False                   Depression Screening  Over the past two weeks, has the patient felt down, depressed, or hopeless?: No  Over the past two weeks, has the patient felt little interest or pleasure in doing things?: No  Functional Ability/Safety Screening  Was the patient's timed Up & Go test unsteady or longer than 30 seconds?: No  Does the patient need help with phone, transportation, shopping, preparing meals, housework, laundry, meds, or managing money?: No  Does the patient's home have rugs in the hallway, lack grab bars in the bathroom, lack handrails on the stairs or have poor lighting?: No  Have you noticed any hearing difficulties?: No  Cognitive Function (Assessed through direct observation with due consideration of information obtained by way of patient reports and/or concerns raised by family, friends,  caretakers, or others)    Does the patient repeat questions/statements in the same day?: No  Does the patient have trouble remembering the date, year, and time?: No  Does the patient have difficulty managing finances?: No  Does the patient have a decreased sense of direction?: No         Alcohol/Tobacco Use - Stressed importance of smoking cessation and limiting alcohol intake.  CVD Risk Factors - Reviewed  Obesity/Physical Activity - Encouraged daily 30 minute physical activity x 5 days per week.    Opioid Screening: Patient medication list reviewed, patient is not taking prescription opioids. Patient is not using additional opioids than prescribed. Patient is at low risk of substance abuse based on this opioid use history.        Health Maintenance Due   Topic Date Due    COVID-19 Vaccine (1) Never done    TETANUS VACCINE  Never done    DEXA Scan  Never done    Colorectal Cancer Screening  Never done    Shingles Vaccine (1 of 2) Never done    RSV Vaccine (Age 60+) (1 - 1-dose 60+ series) Never done    Pneumococcal Vaccines (Age 65+) (1 - PCV) Never done    Influenza Vaccine (1) Never done    Mammogram  11/07/2023    Eye Exam - Recommend annually   Dental Exam - Recommend biannually  Vaccinations -   There is no immunization history on file for this patient.     Advance Care Planning     Date: 10/31/2023  Patient has living will and will provide office with copy.      1. Routine general medical examination at a health care facility    2. Dementia, unspecified dementia severity, unspecified dementia type, unspecified whether behavioral, psychotic, or mood disturbance or anxiety  well controlled on current prescription medication    3. Morbid obesity  Recommended diet and exercise as tolerated.   4. Mixed hyperlipidemia  - atorvastatin (LIPITOR) 10 MG tablet; Take 1 tablet (10 mg total) by mouth once daily.  Dispense: 90 tablet; Refill: 3    Patient had FIT test through insurance and recently mailed it off.    Medication List with Changes/Refills   New Medications    ATORVASTATIN (LIPITOR) 10 MG TABLET    Take 1 tablet (10 mg total) by mouth once daily.       Start Date: 10/31/2023End Date: 10/30/2024   Current Medications    ELIQUIS 5 MG TAB    Take 5 mg by mouth 2 (two) times daily.       Start Date: 10/10/2022End Date: --    GABAPENTIN (NEURONTIN) 600 MG TABLET    TAKE 1 TABLET THREE TIMES DAILY       Start Date: 9/5/2023  End Date: --    LEVOTHYROXINE (SYNTHROID) 75 MCG TABLET    TAKE 1 TABLET (75 MCG TOTAL) BY MOUTH BEFORE BREAKFAST.       Start Date: 5/8/2023  End Date: --    MELATONIN 10 MG TAB    Take 1 tablet by mouth nightly as needed for Insomnia.       Start Date: --        End Date: --    MEMANTINE (NAMENDA) 5 MG TAB    Take 1 tablet (5 mg total) by mouth 2 (two) times daily.       Start Date: 7/19/2023 End Date: 7/18/2024    PROPRANOLOL (INDERAL) 40 MG TABLET    TAKE 1 TABLET TWICE DAILY       Start Date: 9/11/2023 End Date: --      No follow-ups on file. In addition to their scheduled follow up, the patient has also been instructed to follow up on as needed basis.     Provided patient with a 5-10 year written screening schedule and personal prevention plan. Recommendations were developed using the USPSTF age appropriate recommendations. Education, counseling, and referrals were provided as needed. After Visit Summary printed and given to patient which includes a list of additional screenings\tests needed.

## 2023-11-09 ENCOUNTER — HOSPITAL ENCOUNTER (OUTPATIENT)
Dept: RADIOLOGY | Facility: HOSPITAL | Age: 73
Discharge: HOME | End: 2023-11-09
Attending: FAMILY MEDICINE
Payer: MEDICARE

## 2023-11-09 DIAGNOSIS — Z12.31 BREAST CANCER SCREENING BY MAMMOGRAM: ICD-10-CM

## 2023-11-09 PROCEDURE — 77063 BREAST TOMOSYNTHESIS BI: CPT | Mod: 26,,, | Performed by: RADIOLOGY

## 2023-11-09 PROCEDURE — 77067 MAMMO DIGITAL SCREENING BILAT WITH TOMO: ICD-10-PCS | Mod: 26,,, | Performed by: RADIOLOGY

## 2023-11-09 PROCEDURE — 77063 MAMMO DIGITAL SCREENING BILAT WITH TOMO: ICD-10-PCS | Mod: 26,,, | Performed by: RADIOLOGY

## 2023-11-09 PROCEDURE — 77067 SCR MAMMO BI INCL CAD: CPT | Mod: 26,,, | Performed by: RADIOLOGY

## 2023-11-09 PROCEDURE — 77067 SCR MAMMO BI INCL CAD: CPT | Mod: TC

## 2023-11-15 ENCOUNTER — PATIENT MESSAGE (OUTPATIENT)
Dept: ADMINISTRATIVE | Facility: HOSPITAL | Age: 73
End: 2023-11-15
Payer: MEDICARE

## 2023-11-30 LAB — NONINV COLON CA DNA+OCC BLD SCRN STL QL: NEGATIVE

## 2023-12-12 ENCOUNTER — OFFICE VISIT (OUTPATIENT)
Dept: FAMILY MEDICINE | Facility: CLINIC | Age: 73
End: 2023-12-12
Payer: MEDICARE

## 2023-12-12 ENCOUNTER — HOSPITAL ENCOUNTER (OUTPATIENT)
Dept: RADIOLOGY | Facility: HOSPITAL | Age: 73
Discharge: HOME | End: 2023-12-12
Payer: MEDICARE

## 2023-12-12 VITALS
HEART RATE: 84 BPM | TEMPERATURE: 100 F | SYSTOLIC BLOOD PRESSURE: 137 MMHG | HEIGHT: 65 IN | DIASTOLIC BLOOD PRESSURE: 74 MMHG | BODY MASS INDEX: 47.65 KG/M2 | OXYGEN SATURATION: 94 % | RESPIRATION RATE: 20 BRPM | WEIGHT: 286 LBS

## 2023-12-12 DIAGNOSIS — R06.2 WHEEZING: ICD-10-CM

## 2023-12-12 DIAGNOSIS — R05.9 COUGH, UNSPECIFIED TYPE: ICD-10-CM

## 2023-12-12 DIAGNOSIS — J22 UNSPECIFIED ACUTE LOWER RESPIRATORY INFECTION: ICD-10-CM

## 2023-12-12 DIAGNOSIS — J44.89 OTHER SPECIFIED CHRONIC OBSTRUCTIVE PULMONARY DISEASE: ICD-10-CM

## 2023-12-12 DIAGNOSIS — R68.83 CHILLS: ICD-10-CM

## 2023-12-12 DIAGNOSIS — R52 BODY ACHES: Primary | ICD-10-CM

## 2023-12-12 DIAGNOSIS — R09.81 SINUS CONGESTION: ICD-10-CM

## 2023-12-12 LAB
CTP QC/QA: YES
POC MOLECULAR INFLUENZA A AGN: NEGATIVE
POC MOLECULAR INFLUENZA B AGN: NEGATIVE

## 2023-12-12 PROCEDURE — 3288F PR FALLS RISK ASSESSMENT DOCUMENTED: ICD-10-PCS | Mod: CPTII,,,

## 2023-12-12 PROCEDURE — 99214 OFFICE O/P EST MOD 30 MIN: CPT | Mod: ,,,

## 2023-12-12 PROCEDURE — 87502 INFLUENZA DNA AMP PROBE: CPT | Mod: QW,,,

## 2023-12-12 PROCEDURE — 3075F PR MOST RECENT SYSTOLIC BLOOD PRESS GE 130-139MM HG: ICD-10-PCS | Mod: CPTII,,,

## 2023-12-12 PROCEDURE — 71046 X-RAY EXAM CHEST 2 VIEWS: CPT | Mod: TC

## 2023-12-12 PROCEDURE — 3288F FALL RISK ASSESSMENT DOCD: CPT | Mod: CPTII,,,

## 2023-12-12 PROCEDURE — 1126F AMNT PAIN NOTED NONE PRSNT: CPT | Mod: CPTII,,,

## 2023-12-12 PROCEDURE — 3078F DIAST BP <80 MM HG: CPT | Mod: CPTII,,,

## 2023-12-12 PROCEDURE — 1101F PR PT FALLS ASSESS DOC 0-1 FALLS W/OUT INJ PAST YR: ICD-10-PCS | Mod: CPTII,,,

## 2023-12-12 PROCEDURE — 1160F RVW MEDS BY RX/DR IN RCRD: CPT | Mod: CPTII,,,

## 2023-12-12 PROCEDURE — 3008F PR BODY MASS INDEX (BMI) DOCUMENTED: ICD-10-PCS | Mod: CPTII,,,

## 2023-12-12 PROCEDURE — 1159F PR MEDICATION LIST DOCUMENTED IN MEDICAL RECORD: ICD-10-PCS | Mod: CPTII,,,

## 2023-12-12 PROCEDURE — 1160F PR REVIEW ALL MEDS BY PRESCRIBER/CLIN PHARMACIST DOCUMENTED: ICD-10-PCS | Mod: CPTII,,,

## 2023-12-12 PROCEDURE — 1126F PR PAIN SEVERITY QUANTIFIED, NO PAIN PRESENT: ICD-10-PCS | Mod: CPTII,,,

## 2023-12-12 PROCEDURE — 3078F PR MOST RECENT DIASTOLIC BLOOD PRESSURE < 80 MM HG: ICD-10-PCS | Mod: CPTII,,,

## 2023-12-12 PROCEDURE — 1159F MED LIST DOCD IN RCRD: CPT | Mod: CPTII,,,

## 2023-12-12 PROCEDURE — 87502 POCT INFLUENZA A/B MOLECULAR: ICD-10-PCS | Mod: QW,,,

## 2023-12-12 PROCEDURE — 99214 PR OFFICE/OUTPT VISIT, EST, LEVL IV, 30-39 MIN: ICD-10-PCS | Mod: ,,,

## 2023-12-12 PROCEDURE — 3008F BODY MASS INDEX DOCD: CPT | Mod: CPTII,,,

## 2023-12-12 PROCEDURE — 1101F PT FALLS ASSESS-DOCD LE1/YR: CPT | Mod: CPTII,,,

## 2023-12-12 PROCEDURE — 3075F SYST BP GE 130 - 139MM HG: CPT | Mod: CPTII,,,

## 2023-12-12 NOTE — PROGRESS NOTES
Patient ID: [id number]     Chief Complaint: Cough, Generalized Body Aches, Chills, Sore Throat, Sinusitis (Sneezing, congestion, watery eyes, runny nose), Ear Fullness, and Fever      HPI:     Estephania Herrera is a 73 y.o. female here today for a problem visit. Complaining of fevers up to 104 degrees, headache, itching in eyes, myalgias, nasal blockage, post nasal drip, productive cough, sinus and nasal congestion, sore throat, and wheezing  x 2 days.  Denies fever, body aches, chills, SOB, difficulty breathing, or CP. Admits to no known sick contacts. .     Past Medical History:   Diagnosis Date    Acute respiratory failure due to COVID-19     Chronic atrial fibrillation     Chronic back pain     Edema of lower extremity     Endometrial mass     Essential tremor     Family history of bladder cancer     brother    Family history of breast cancer in mother     Family history of breast cancer in sister     Family history of colon cancer in mother     Hair loss     History of falling     History of pressure ulcer     HLD (hyperlipidemia)     HTN (hypertension)     Hypothyroidism, unspecified     Insomnia     Left foot drop     Mitral regurgitation     Moderate aortic regurgitation     DELANEY (obstructive sleep apnea)     DELANEY on CPAP     Osteoporosis     PAC (premature atrial contraction)     Pneumonia due to COVID-19 virus     Pulmonic valve regurgitation     PVC (premature ventricular contraction)     Sciatica     SVT (supraventricular tachycardia)     Tricuspid regurgitation     Vitamin D deficiency         Past Surgical History:   Procedure Laterality Date    ANKLE SURGERY      CATARACT EXTRACTION  10/27/2022    Dr Avis Pastor    CATARACT EXTRACTION  2022     SECTION  1971    Dr Albina Rendon     SECTION  1973    Dr Jeremias Dixon    FRACTURE SURGERY  1965    HYSTEROSCOPY WITH DILATION AND CURETTAGE OF UTERUS  2017    Dr Galdino Del Angel    TOTAL ABDOMINAL  HYSTERECTOMY W/ BILATERAL SALPINGOOPHORECTOMY Bilateral 05/31/2017    Dr Galdino Del Angel    TUBAL LIGATION  1980        Social History     Socioeconomic History    Marital status:    Tobacco Use    Smoking status: Former     Current packs/day: 1.00     Average packs/day: 1 pack/day for 5.0 years (5.0 ttl pk-yrs)     Types: Cigarettes    Smokeless tobacco: Never   Substance and Sexual Activity    Alcohol use: Never    Drug use: Never    Sexual activity: Not Currently     Birth control/protection: See Surgical Hx, Post-menopausal     Social Determinants of Health     Financial Resource Strain: Low Risk  (4/24/2023)    Overall Financial Resource Strain (CARDIA)     Difficulty of Paying Living Expenses: Not hard at all   Food Insecurity: No Food Insecurity (4/24/2023)    Hunger Vital Sign     Worried About Running Out of Food in the Last Year: Never true     Ran Out of Food in the Last Year: Never true   Transportation Needs: No Transportation Needs (4/24/2023)    PRAPARE - Transportation     Lack of Transportation (Medical): No     Lack of Transportation (Non-Medical): No   Physical Activity: Sufficiently Active (4/24/2023)    Exercise Vital Sign     Days of Exercise per Week: 5 days     Minutes of Exercise per Session: 30 min   Stress: No Stress Concern Present (4/24/2023)    [employer] [place] [employer] - Occupational Stress Questionnaire     Feeling of Stress : Not at all   Social Connections: Moderately Integrated (4/24/2023)    Social Connection and Isolation Panel [NHANES]     Frequency of Communication with Friends and Family: More than three times a week     Frequency of Social Gatherings with Friends and Family: More than three times a week     Attends Gnosticism Services: More than 4 times per year     Active Member of Clubs or Organizations: Yes     Attends Club or Organization Meetings: More than 4 times per year     Marital Status:    Housing Stability: Low Risk   "(4/24/2023)    Housing Stability Vital Sign     Unable to Pay for Housing in the Last Year: No     Number of Places Lived in the Last Year: 1     Unstable Housing in the Last Year: No        Current Outpatient Medications   Medication Instructions    atorvastatin (LIPITOR) 10 mg, Oral, Daily    doxycycline (VIBRAMYCIN) 100 mg, Oral, Every 12 hours    ELIQUIS 5 mg, Oral, 2 times daily    gabapentin (NEURONTIN) 600 mg, Oral, 3 times daily    guaiFENesin (MUCINEX) 1,200 mg, Oral, 2 times daily    levothyroxine (SYNTHROID) 75 mcg, Oral, Before breakfast    melatonin 10 mg Tab 1 tablet, Oral, Nightly PRN    memantine (NAMENDA) 5 mg, Oral, 2 times daily    predniSONE (DELTASONE) 20 mg, Oral, Daily    propranoloL (INDERAL) 40 mg, Oral, 2 times daily       Review of patient's allergies indicates:  No Known Allergies     Patient Care Team:  Keith Briones DO as PCP - General (Family Medicine)  Carl Bustos MD as Consulting Physician (Otolaryngology)  Steve Tamez MD as Consulting Physician (Cardiology)  Avis Pastor MD (Ophthalmology)  Thien Bettencourt MD as Consulting Physician (Neurology)  Paul Kramer FNP as Nurse Practitioner (Cardiology)     Subjective:     Review of Systems    12 point review of systems conducted, negative except as stated in the history of present illness. See HPI for details.    Objective:     Visit Vitals  /74   Pulse 84   Temp (!) 100.4 °F (38 °C) (Tympanic)   Resp 20   Ht 5' 4.96" (1.65 m)   Wt 129.7 kg (286 lb)   SpO2 (!) 94%   BMI 47.65 kg/m²       Physical Exam  Vitals and nursing note reviewed.   Constitutional:       General: She is not in acute distress.     Appearance: She is not ill-appearing.   HENT:      Head: Normocephalic and atraumatic.      Nose: Nasal tenderness, congestion and rhinorrhea present. Rhinorrhea is purulent.      Right Sinus: Maxillary sinus tenderness present.      Left Sinus: Maxillary sinus tenderness present.      " Mouth/Throat:      Mouth: Mucous membranes are moist.      Pharynx: Posterior oropharyngeal erythema present.   Eyes:      General: No scleral icterus.     Extraocular Movements: Extraocular movements intact.      Conjunctiva/sclera: Conjunctivae normal.      Pupils: Pupils are equal, round, and reactive to light.   Neck:      Vascular: No carotid bruit.   Cardiovascular:      Rate and Rhythm: Normal rate and regular rhythm.      Heart sounds: No murmur heard.     No friction rub. No gallop.   Pulmonary:      Effort: Pulmonary effort is normal. No respiratory distress.      Breath sounds: Decreased breath sounds and wheezing present. No rhonchi or rales.   Abdominal:      General: Abdomen is flat. Bowel sounds are normal. There is no distension.      Palpations: Abdomen is soft. There is no mass.      Tenderness: There is no abdominal tenderness.   Musculoskeletal:         General: Normal range of motion.      Cervical back: Normal range of motion and neck supple.   Skin:     General: Skin is warm and dry.   Neurological:      General: No focal deficit present.      Mental Status: She is alert.   Psychiatric:         Mood and Affect: Mood normal.         Assessment:       ICD-10-CM ICD-9-CM   1. Body aches  R52 780.96   2. Sinus congestion  R09.81 478.19   3. Cough, unspecified type  R05.9 786.2   4. Chills  R68.83 780.64   5. Pyrexia of unknown origin following delivery  O86.4 672.00   6. Unspecified acute lower respiratory infection  J22 519.8   7. Other specified chronic obstructive pulmonary disease  J44.89 496   8. Wheezing  R06.2 786.07        Plan:     1. Body aches  -     POCT Influenza A/B Molecular  -     Respiratory Panel; Future; Expected date: 12/12/2023  -     COVID/RSV/FLU A&B PCR; Future; Expected date: 12/12/2023    2. Sinus congestion  -     guaiFENesin (MUCINEX) 600 mg 12 hr tablet; Take 2 tablets (1,200 mg total) by mouth 2 (two) times daily. for 3 days  Dispense: 12 tablet; Refill: 0    3.  Cough, unspecified type  -     POCT Influenza A/B Molecular  -     Respiratory Panel; Future; Expected date: 12/12/2023  -     COVID/RSV/FLU A&B PCR; Future; Expected date: 12/12/2023    4. Chills  -     POCT Influenza A/B Molecular  -     Respiratory Panel; Future; Expected date: 12/12/2023    5. Pyrexia of unknown origin following delivery  -     Respiratory Panel; Future; Expected date: 12/12/2023  -     COVID/RSV/FLU A&B PCR; Future; Expected date: 12/12/2023    6. Unspecified acute lower respiratory infection  -     Respiratory Panel; Future; Expected date: 12/12/2023  -     COVID/RSV/FLU A&B PCR; Future; Expected date: 12/12/2023  -     predniSONE (DELTASONE) 20 MG tablet; Take 1 tablet (20 mg total) by mouth once daily. for 5 days  Dispense: 5 tablet; Refill: 0  -     doxycycline (VIBRAMYCIN) 100 MG Cap; Take 1 capsule (100 mg total) by mouth every 12 (twelve) hours. for 7 days  Dispense: 14 capsule; Refill: 0    7. Other specified chronic obstructive pulmonary disease  -     Respiratory Panel; Future; Expected date: 12/12/2023    8. Wheezing  -     X-Ray Chest PA And Lateral; Future; Expected date: 12/12/2023      Follow up if symptoms worsen or fail to improve. In addition to their scheduled follow up, the patient has also been instructed to follow up on as needed basis.     Future Appointments   Date Time Provider Department Center   4/29/2024  1:15 PM Keith Briones DO [facility]   7/22/2024  2:30 PM Thien Bettencourt MD [address]   11/5/2024  1:00 PM Keith Briones DO [facility] Ka'Ryn Franklin, NP

## 2023-12-13 RX ORDER — PREDNISONE 20 MG/1
20 TABLET ORAL DAILY
Qty: 5 TABLET | Refills: 0 | Status: SHIPPED | OUTPATIENT
Start: 2023-12-13 | End: 2023-12-18

## 2023-12-13 RX ORDER — GUAIFENESIN 600 MG/1
1200 TABLET, EXTENDED RELEASE ORAL 2 TIMES DAILY
Qty: 12 TABLET | Refills: 0 | Status: SHIPPED | OUTPATIENT
Start: 2023-12-13 | End: 2023-12-16

## 2023-12-13 RX ORDER — DOXYCYCLINE 100 MG/1
100 CAPSULE ORAL EVERY 12 HOURS
Qty: 14 CAPSULE | Refills: 0 | Status: SHIPPED | OUTPATIENT
Start: 2023-12-13 | End: 2023-12-20

## 2024-05-05 DIAGNOSIS — E03.1 CONGENITAL HYPOTHYROIDISM WITHOUT GOITER: ICD-10-CM

## 2024-05-06 RX ORDER — LEVOTHYROXINE SODIUM 75 UG/1
75 TABLET ORAL
Qty: 90 TABLET | Refills: 3 | Status: SHIPPED | OUTPATIENT
Start: 2024-05-06

## 2024-07-29 ENCOUNTER — OFFICE VISIT (OUTPATIENT)
Dept: NEUROLOGY | Facility: CLINIC | Age: 74
End: 2024-07-29
Payer: MEDICARE

## 2024-07-29 VITALS — DIASTOLIC BLOOD PRESSURE: 86 MMHG | SYSTOLIC BLOOD PRESSURE: 167 MMHG

## 2024-07-29 DIAGNOSIS — E66.01 MORBID OBESITY: ICD-10-CM

## 2024-07-29 DIAGNOSIS — G24.5 BLEPHAROSPASM: Primary | ICD-10-CM

## 2024-07-29 DIAGNOSIS — G25.0 ESSENTIAL TREMOR: ICD-10-CM

## 2024-07-29 DIAGNOSIS — F03.90 DEMENTIA, UNSPECIFIED DEMENTIA SEVERITY, UNSPECIFIED DEMENTIA TYPE, UNSPECIFIED WHETHER BEHAVIORAL, PSYCHOTIC, OR MOOD DISTURBANCE OR ANXIETY: ICD-10-CM

## 2024-07-29 PROCEDURE — 1101F PT FALLS ASSESS-DOCD LE1/YR: CPT | Mod: CPTII,S$GLB,, | Performed by: PSYCHIATRY & NEUROLOGY

## 2024-07-29 PROCEDURE — 1125F AMNT PAIN NOTED PAIN PRSNT: CPT | Mod: CPTII,S$GLB,, | Performed by: PSYCHIATRY & NEUROLOGY

## 2024-07-29 PROCEDURE — 3079F DIAST BP 80-89 MM HG: CPT | Mod: CPTII,S$GLB,, | Performed by: PSYCHIATRY & NEUROLOGY

## 2024-07-29 PROCEDURE — 3077F SYST BP >= 140 MM HG: CPT | Mod: CPTII,S$GLB,, | Performed by: PSYCHIATRY & NEUROLOGY

## 2024-07-29 PROCEDURE — 99999 PR PBB SHADOW E&M-EST. PATIENT-LVL III: CPT | Mod: PBBFAC,,, | Performed by: PSYCHIATRY & NEUROLOGY

## 2024-07-29 PROCEDURE — 99214 OFFICE O/P EST MOD 30 MIN: CPT | Mod: S$GLB,,, | Performed by: PSYCHIATRY & NEUROLOGY

## 2024-07-29 PROCEDURE — 1159F MED LIST DOCD IN RCRD: CPT | Mod: CPTII,S$GLB,, | Performed by: PSYCHIATRY & NEUROLOGY

## 2024-07-29 PROCEDURE — 3288F FALL RISK ASSESSMENT DOCD: CPT | Mod: CPTII,S$GLB,, | Performed by: PSYCHIATRY & NEUROLOGY

## 2024-07-29 RX ORDER — PROPRANOLOL HYDROCHLORIDE 40 MG/1
40 TABLET ORAL 2 TIMES DAILY
Qty: 180 TABLET | Refills: 4 | Status: SHIPPED | OUTPATIENT
Start: 2024-07-29

## 2024-07-29 NOTE — PROGRESS NOTES
Subjective     Patient ID: Estephania Herrera is a 74 y.o. female.    Chief Complaint: Tremors    HPI  Tremor mild, stable with meds  Stopped taking memantine as her memory has gotten better after covid. Stated she gets dizzy with positions.       Review of Systems  The remainder of the 14 system ROS is noncontributory or negative unless mentioned/reviewed above.       Objective     Physical Exam  Mild LUE and head tremor  Mental Status: Alert and oriented x3. Language is fluent with good comprehension.    Cranial Nerve: Pupils are equal, round, and reactive to light. Visual fields are intact to confrontation. Normal fundi. Ocular movements are intact. Face is symmetric at rest and with activation with intact sensation throughout. Hearing intact to finger rub bilaterally. Muscles of tongue and palate activate symmetrically. No dysarthria. Strength is full in sternocleidomastoid and trapezius bilaterally.    Motor: Muscle bulk and tone are normal. Strength is 5/5 in all four extremities both proximally and distally. Intact fine motor movements bilaterally. There is no pronator drift or satelliting on arm roll.    Sensory: Sensation is intact to light touch, pinprick, vibration, and proprioception throughout. Romberg is negative.    Reflexes: 2+ and symmetric at the biceps, triceps, brachioradialis, patella, and Achilles bilaterally. Plantar response is flexor bilaterally.    Coordination: No dysmetria on finger-nose-finger or heel-knee-shin. Normal rapid alternating movements. Fast finger tapping with normal amplitude and speed.    Gait: Narrow based with normal stride length and good arm swing bilaterally. Able to walk on heels, toes, and in tandem.       Assessment and Plan     1. Blepharospasm    2. Morbid obesity    3. Dementia, unspecified dementia severity, unspecified dementia type, unspecified whether behavioral, psychotic, or mood disturbance or anxiety    4. Essential tremor  -     propranoloL (INDERAL) 40 MG  tablet; Take 1 tablet (40 mg total) by mouth 2 (two) times daily.  Dispense: 180 tablet; Refill: 4    Continue propranolol daily  Encouraged hydration  Instructed about doing Epley maneuver for positional dizziness         Follow up in about 1 year (around 7/29/2025).      Migue [place]

## 2024-09-25 ENCOUNTER — TELEPHONE (OUTPATIENT)
Dept: FAMILY MEDICINE | Facility: CLINIC | Age: 74
End: 2024-09-25
Payer: MEDICARE

## 2024-09-25 DIAGNOSIS — G25.0 ESSENTIAL TREMOR: ICD-10-CM

## 2024-09-25 RX ORDER — PROPRANOLOL HYDROCHLORIDE 40 MG/1
40 TABLET ORAL 2 TIMES DAILY
Qty: 180 TABLET | Refills: 2 | Status: SHIPPED | OUTPATIENT
Start: 2024-09-25

## 2024-10-22 DIAGNOSIS — Z00.00 WELLNESS EXAMINATION: Primary | ICD-10-CM

## 2024-10-22 DIAGNOSIS — E78.2 MIXED HYPERLIPIDEMIA: ICD-10-CM

## 2024-10-22 DIAGNOSIS — E03.1 CONGENITAL HYPOTHYROIDISM WITHOUT GOITER: ICD-10-CM

## 2024-10-25 ENCOUNTER — PATIENT MESSAGE (OUTPATIENT)
Facility: CLINIC | Age: 74
End: 2024-10-25
Payer: MEDICARE

## 2024-10-30 ENCOUNTER — LAB VISIT (OUTPATIENT)
Dept: LAB | Facility: HOSPITAL | Age: 74
End: 2024-10-30
Attending: FAMILY MEDICINE
Payer: MEDICARE

## 2024-10-30 DIAGNOSIS — Z00.00 WELLNESS EXAMINATION: ICD-10-CM

## 2024-10-30 DIAGNOSIS — E03.1 CONGENITAL HYPOTHYROIDISM WITHOUT GOITER: ICD-10-CM

## 2024-10-30 DIAGNOSIS — E78.2 MIXED HYPERLIPIDEMIA: ICD-10-CM

## 2024-10-30 LAB
ALBUMIN SERPL-MCNC: 3.5 G/DL (ref 3.4–4.8)
ALBUMIN/GLOB SERPL: 0.9 RATIO (ref 1.1–2)
ALP SERPL-CCNC: 74 UNIT/L (ref 40–150)
ALT SERPL-CCNC: 12 UNIT/L (ref 0–55)
ANION GAP SERPL CALC-SCNC: 9 MEQ/L
AST SERPL-CCNC: 16 UNIT/L (ref 5–34)
BASOPHILS # BLD AUTO: 0.06 X10(3)/MCL
BASOPHILS NFR BLD AUTO: 0.8 %
BILIRUB SERPL-MCNC: 0.5 MG/DL
BUN SERPL-MCNC: 11 MG/DL (ref 9.8–20.1)
CALCIUM SERPL-MCNC: 9.6 MG/DL (ref 8.4–10.2)
CHLORIDE SERPL-SCNC: 111 MMOL/L (ref 98–107)
CHOLEST SERPL-MCNC: 257 MG/DL
CHOLEST/HDLC SERPL: 5 {RATIO} (ref 0–5)
CO2 SERPL-SCNC: 22 MMOL/L (ref 23–31)
CREAT SERPL-MCNC: 0.85 MG/DL (ref 0.55–1.02)
CREAT/UREA NIT SERPL: 13
EOSINOPHIL # BLD AUTO: 0.25 X10(3)/MCL (ref 0–0.9)
EOSINOPHIL NFR BLD AUTO: 3.3 %
ERYTHROCYTE [DISTWIDTH] IN BLOOD BY AUTOMATED COUNT: 15.2 % (ref 11.5–17)
GFR SERPLBLD CREATININE-BSD FMLA CKD-EPI: >60 ML/MIN/1.73/M2
GLOBULIN SER-MCNC: 3.7 GM/DL (ref 2.4–3.5)
GLUCOSE SERPL-MCNC: 108 MG/DL (ref 82–115)
HCT VFR BLD AUTO: 43.6 % (ref 37–47)
HDLC SERPL-MCNC: 52 MG/DL (ref 35–60)
HGB BLD-MCNC: 13.8 G/DL (ref 12–16)
IMM GRANULOCYTES # BLD AUTO: 0.02 X10(3)/MCL (ref 0–0.04)
IMM GRANULOCYTES NFR BLD AUTO: 0.3 %
LDLC SERPL CALC-MCNC: 141 MG/DL (ref 50–140)
LYMPHOCYTES # BLD AUTO: 2.12 X10(3)/MCL (ref 0.6–4.6)
LYMPHOCYTES NFR BLD AUTO: 28.1 %
MCH RBC QN AUTO: 29.1 PG (ref 27–31)
MCHC RBC AUTO-ENTMCNC: 31.7 G/DL (ref 33–36)
MCV RBC AUTO: 91.8 FL (ref 80–94)
MONOCYTES # BLD AUTO: 0.82 X10(3)/MCL (ref 0.1–1.3)
MONOCYTES NFR BLD AUTO: 10.9 %
NEUTROPHILS # BLD AUTO: 4.28 X10(3)/MCL (ref 2.1–9.2)
NEUTROPHILS NFR BLD AUTO: 56.6 %
NRBC BLD AUTO-RTO: 0 %
PLATELET # BLD AUTO: 267 X10(3)/MCL (ref 130–400)
PMV BLD AUTO: 11.9 FL (ref 7.4–10.4)
POTASSIUM SERPL-SCNC: 4.4 MMOL/L (ref 3.5–5.1)
PROT SERPL-MCNC: 7.2 GM/DL (ref 5.8–7.6)
RBC # BLD AUTO: 4.75 X10(6)/MCL (ref 4.2–5.4)
SODIUM SERPL-SCNC: 142 MMOL/L (ref 136–145)
TRIGL SERPL-MCNC: 320 MG/DL (ref 37–140)
TSH SERPL-ACNC: 4.68 UIU/ML (ref 0.35–4.94)
VLDLC SERPL CALC-MCNC: 64 MG/DL
WBC # BLD AUTO: 7.55 X10(3)/MCL (ref 4.5–11.5)

## 2024-10-30 PROCEDURE — 84443 ASSAY THYROID STIM HORMONE: CPT

## 2024-10-30 PROCEDURE — 80061 LIPID PANEL: CPT

## 2024-10-30 PROCEDURE — 80053 COMPREHEN METABOLIC PANEL: CPT

## 2024-10-30 PROCEDURE — 36415 COLL VENOUS BLD VENIPUNCTURE: CPT

## 2024-10-30 PROCEDURE — 85025 COMPLETE CBC W/AUTO DIFF WBC: CPT

## 2024-11-02 DIAGNOSIS — E78.2 MIXED HYPERLIPIDEMIA: ICD-10-CM

## 2024-11-04 ENCOUNTER — TELEPHONE (OUTPATIENT)
Dept: FAMILY MEDICINE | Facility: CLINIC | Age: 74
End: 2024-11-04
Payer: MEDICARE

## 2024-11-04 RX ORDER — ATORVASTATIN CALCIUM 10 MG/1
10 TABLET, FILM COATED ORAL DAILY
Qty: 90 TABLET | Refills: 3 | Status: SHIPPED | OUTPATIENT
Start: 2024-11-04 | End: 2024-11-05 | Stop reason: SDUPTHER

## 2024-11-04 NOTE — TELEPHONE ENCOUNTER
----- Message from Nurse Moncada sent at 11/4/2024  3:05 PM CST -----    ----- Message -----  From: Monty Urbina NP  Sent: 11/2/2024   4:41 PM CST  To: [facility] Staff    Please inform patient of lab results.     1. Cholesterol, triglycerides, and LDL has increased since last year. She stopped taking Atorvastatin 10 mg daily.     2. All other labs within acceptable ranges.     Please encourage patient to come to appointment so that her plan of care can be discussed.     Thanks for all you do,   Monty

## 2024-11-05 ENCOUNTER — OFFICE VISIT (OUTPATIENT)
Dept: FAMILY MEDICINE | Facility: CLINIC | Age: 74
End: 2024-11-05
Payer: MEDICARE

## 2024-11-05 VITALS
SYSTOLIC BLOOD PRESSURE: 132 MMHG | OXYGEN SATURATION: 97 % | DIASTOLIC BLOOD PRESSURE: 88 MMHG | HEIGHT: 64 IN | TEMPERATURE: 97 F | RESPIRATION RATE: 18 BRPM | WEIGHT: 293 LBS | HEART RATE: 88 BPM | BODY MASS INDEX: 50.02 KG/M2

## 2024-11-05 DIAGNOSIS — E78.2 MIXED HYPERLIPIDEMIA: ICD-10-CM

## 2024-11-05 DIAGNOSIS — I48.0 PAROXYSMAL ATRIAL FIBRILLATION: ICD-10-CM

## 2024-11-05 DIAGNOSIS — Z00.00 ENCOUNTER FOR PREVENTIVE HEALTH EXAMINATION: Primary | ICD-10-CM

## 2024-11-05 DIAGNOSIS — Z12.31 BREAST CANCER SCREENING BY MAMMOGRAM: ICD-10-CM

## 2024-11-05 DIAGNOSIS — M54.42 CHRONIC BILATERAL LOW BACK PAIN WITH LEFT-SIDED SCIATICA: ICD-10-CM

## 2024-11-05 DIAGNOSIS — G89.29 CHRONIC BILATERAL LOW BACK PAIN WITH LEFT-SIDED SCIATICA: ICD-10-CM

## 2024-11-05 DIAGNOSIS — J44.89 OTHER SPECIFIED CHRONIC OBSTRUCTIVE PULMONARY DISEASE: ICD-10-CM

## 2024-11-05 PROBLEM — I87.2 VENOUS INSUFFICIENCY: Status: ACTIVE | Noted: 2022-04-29

## 2024-11-05 PROBLEM — E55.9 VITAMIN D DEFICIENCY: Status: ACTIVE | Noted: 2022-02-08

## 2024-11-05 RX ORDER — ATORVASTATIN CALCIUM 10 MG/1
10 TABLET, FILM COATED ORAL DAILY
Qty: 90 TABLET | Refills: 3 | Status: SHIPPED | OUTPATIENT
Start: 2024-11-05 | End: 2025-11-05

## 2024-11-05 NOTE — PROGRESS NOTES
"   Internal Medicine    Estephania Herrera is a 74 y.o. female here today for a Medicare Annual Wellness visit and comprehensive Health Risk Assessment. Reviewed recent labs. Her CPAP machine broke a month ago and she has been unable to obtain a replacement.     Subjective   The following components were reviewed and updated:  Medical history  Family History  Social history  Allergies  Current Medications  Immunizations  Health Maintenance  Patient Care Team    Review of Systems   Musculoskeletal:  Positive for back pain.     A comprehensive review of systems was conducted and is negative except as noted above.     Objective   Visit Vitals  /88 (BP Location: Left arm, Patient Position: Sitting)   Pulse 88   Temp 97.3 °F (36.3 °C) (Temporal)   Resp 18   Ht 5' 4" (1.626 m)   Wt 133.4 kg (294 lb)   SpO2 97%   BMI 50.46 kg/m²        Physical Exam  Vitals reviewed.   Constitutional:       General: She is not in acute distress.     Appearance: Normal appearance. She is obese.   Cardiovascular:      Rate and Rhythm: Normal rate and regular rhythm.      Heart sounds: No murmur heard.     No friction rub. No gallop.   Pulmonary:      Effort: No respiratory distress.      Breath sounds: No wheezing, rhonchi or rales.   Musculoskeletal:         General: No swelling, tenderness or deformity.      Right lower leg: No edema.      Left lower leg: No edema.   Skin:     General: Skin is warm and dry.      Findings: No lesion or rash.   Neurological:      General: No focal deficit present.      Mental Status: She is alert.   Psychiatric:         Mood and Affect: Mood normal.          Assessment/Plan:  1. Encounter for preventive health examination    2. Mixed hyperlipidemia  -     atorvastatin (LIPITOR) 10 MG tablet; Take 1 tablet (10 mg total) by mouth once daily.  Dispense: 90 tablet; Refill: 3    3. Chronic bilateral low back pain with left-sided sciatica  -     X-Ray Lumbar Spine AP And Lateral; Future; Expected date: " 11/05/2024  -     Ambulatory referral/consult to Physical/Occupational Therapy; Future; Expected date: 11/05/2024    4. Other specified chronic obstructive pulmonary disease  Stable  5. Paroxysmal atrial fibrillation  On propranolol 40mg BID and eliquis 5mg BID  6. Breast cancer screening by mammogram  -     Mammo Digital Screening Bilat w/ [place]; Future; Expected date: 11/12/2024      A comprehensive HEALTH RISK ASSESSMENT was completed today. Results are summarized below:    The following EMOTIONAL/SOCIAL CONCERNS were identified on today's screening for Social Isolation, Depression and Anxiety:  *Patient feels UNABLE TO MANAGE her HEALTH PROBLEMS. (Do you feel that you can control and manage most of your health problems?: (!) No (NOT MY BACK))  *Patient reports her health has LIMITED her SOCIAL INTERACTIONS. (During the past four weeks, has your physical and/or emotional health limited your social activities with family, friends, neighbors, or groups?: (!) Yes)  <repeat PHQ-9>   There are NO COGNITIVE FUNCTION CONCERNS identified on today's screening.    The following FUNCTIONAL AND/OR SAFETY CONCERNS were identified on today's screening for Physical Symptoms, Nutritional, Home Safety/Living Situation, Fall Risk, Activities of Daily Living, Independent Activities of Daily Living, Physical Activity,Timed Up and Go test and Whisper test::  *Patient reports recent HEARING/VISION DIFFICULTIES. (Have you noticed any hearing or vision difficulties?: (!) Yes (vision blurred since eye sx))  *Patient reports NO ROUTINE EXERCISE. (On average, how many days per week do you engage in moderate to strenuous exercise (like a brisk walk)?: (!) 0)  *Patient reports she NEEDS AMBULATION ASSISTANCE. (Do you use an assistance device to easily get around?: (!) Yes)(Ambulation Assistance: Walker (standard))     The patient reports NO OPIOID PRESCRIPTIONS. This was confirmed through medication reconciliation and the [facility] website.    The  patient is NOT A TOBACCO USER.        All Questions regarding food, transportation or housing were not answered today.      I provided Estephania Herrera with a 5-10 year written Screening Schedule per USPSTF age appropriate recommendations and a Personal Prevention Plan based on the results of today's Health Risk Assessment. Education, counseling, and referrals were provided as documented above and can be viewed in the After Visit Summary.    Follow up in about 3 months (around 2/5/2025) for Follow up chronic back pain. In addition to this scheduled follow up, the patient has also been instructed to follow up on as needed basis.     none  The patient was asked and declined the use of a free .  Advance Care Planning   Today we discussed advance care planning. She is interested in learning more about how to make Advance Directives. Information was provided and I offered to discuss more at her discretion.

## 2024-11-05 NOTE — PATIENT INSTRUCTIONS
Medicare Annual Wellness Visit      Patient Name: Estephania Herrera  Today's Date: 11/5/2024    Below you will find your 5-10 year Screening Plan Recommendations:  Health Maintenance       Date Due Completion Date    Pneumococcal Vaccines (Age 65+) (1 of 2 - PCV) Never done ---    TETANUS VACCINE Never done ---    DEXA Scan Never done ---    Shingles Vaccine (1 of 2) Never done ---    RSV Vaccine (Age 60+ and Pregnant patients) (1 - Risk 60-74 years 1-dose series) Never done ---    Influenza Vaccine (1) Never done ---    COVID-19 Vaccine (1 - 2024-25 season) Never done ---    Mammogram 11/09/2024 11/9/2023    Colorectal Cancer Screening 11/20/2026 11/20/2023    Lipid Panel 10/30/2029 10/30/2024          Below is your summarized Personalized Prevention Plan that addresses any concerns we discussed today at your visit. Please see attached detailed information specific to your Health Concerns.  No orders of the defined types were placed in this encounter.        The following information is provided to all patients.  This information is to help you find additional resources for any problems that may be affecting your health: Living healthy guide: www.Counts include 234 beds at the [facility].[place].gov      Understanding Diabetes: www.diabetes.org      Eating healthy: www.cdc.gov/healthyweight      CDC home safety checklist: www.cdc.gov/steadi/patient.html      Agency on Aging: www.goea.[place].[facility]      Alcoholics anonymous (AA): www.aa.org      Physical Activity: www.niecy.nih.gov/sq3mdeq      Tobacco use: www.quitwithusla.org

## 2024-11-11 ENCOUNTER — CLINICAL SUPPORT (OUTPATIENT)
Dept: REHABILITATION | Facility: HOSPITAL | Age: 74
End: 2024-11-11
Attending: FAMILY MEDICINE
Payer: MEDICARE

## 2024-11-11 DIAGNOSIS — Z74.09 IMPAIRED FUNCTIONAL MOBILITY, BALANCE, GAIT, AND ENDURANCE: ICD-10-CM

## 2024-11-11 DIAGNOSIS — M54.42 CHRONIC BILATERAL LOW BACK PAIN WITH LEFT-SIDED SCIATICA: Primary | ICD-10-CM

## 2024-11-11 DIAGNOSIS — G89.29 CHRONIC BILATERAL LOW BACK PAIN WITH LEFT-SIDED SCIATICA: Primary | ICD-10-CM

## 2024-11-11 PROCEDURE — 97163 PT EVAL HIGH COMPLEX 45 MIN: CPT

## 2024-11-11 NOTE — PROGRESS NOTES
"[facility]  Physical Therapy Initial Evaluation    Name: Estephania Herrera  Clinic Number: [id number]    Therapy Diagnosis: No diagnosis found.  Physician: Keith Briones DO    Physician Orders: Evaluate and treat  Medical Diagnosis from Referral: Chronic low back pain with left sided sciatica  Evaluation Date: 11/11/2024  Authorization Period Expiration: determined by insurance  Plan of Care Expiration: 01/27/2025  Visit # / Visits authorized: determined by insurance    Time In: 1105  Time Out: 1206  Total Appointment Time (timed & untimed codes): 61 minutes  Total Treatment time (time-based codes) separate from Evaluation: 0 minutes    Surgery: no  Orthopedic Precautions: none  Pertinent History: see medical chart    Subjective     Estephania reports: a history low back pain of ~30 yrs. 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 Reports extensive history of back pain that really worsened in 2021 after yulissa COVID. She was intubated on a ventilator for 2 weeks resulting in extreme weakness. She was completely dependent at that point and had 10/10 level back and leg pain and reports that she had left "drop foot.". During her stay she was given Gabapentin which really helped her leg pain. She went through inpatient therapy and then transitioned to Swing bed rehab where she eventually learned to walk again and began doing things for herself. For her back and leg pain she has seen a Chiropractor on 2 occasions and even tried Acupuncture all with limited results. She remained on the Gabapentin all during this time. She recently got off it because she felt like she had been on it too long and this coincides with her increase in pain. She describes her pain as mostly constant that is relieved with sitting, but not prolonged sitting. Her pain is central to left low back but there are times with high level pain on the right " "side. Pain will radiate intermittently into the left lower extremity. There is mostly constant numbness into the leg down to the foot and the leg feels "heavy. " Has to lift the leg with her hands to get into the car. When the numbness is not there the touch sensation is not normal, it is dull. She is a side sleeper and overall sleeps ok but has back pain when trying to turn over in bed. Her back is quite stiff when getting up in the mornings. Her symptoms and in particular her back pain is exacerbated with activities, especially any house work. She will sit and roll around on her rollator walker to do things otherwise she will get 10/10 pain. Trying to make her bed is really painful because she has to stand. Standing and walking are her greatest antagonists. Standing time or walking time is very limited and will have to sit down to recover and reduce the pain. Can sit for a short while and go again but is limited.    Reason for visit: chronic low back pain  Onset time and any changes: worse the last 3 months  Pain level and location: 5/10  Radiate proximal or distal: no leg pain today but there is some numbness  Describe pain: aching, sharp, dull, stiffness, and throbbing  Numbness/tingling: yes into the left leg  Agg factors: prolong activity but especially with standing or walking  Ease factors: rest, sitting or lying, meds, heat   Sleeping position: sides  Worse when: mornings when getting out of bed, prolong activity  Red flags: denies loss of bowel or bladder symptoms  Functional Limitations: limited with ADL's, limited functional capacity.  Goals: get rid of her pain and to be able to her housework and other ADL's  Prior therapy/intervention: Chiropractor, Accupuncture      Medical History:   Past Medical History:   Diagnosis Date    Acute respiratory failure due to COVID-19     Chronic atrial fibrillation     Chronic back pain     Edema of lower extremity     Endometrial mass     Essential tremor     Family " history of bladder cancer     brother    Family history of breast cancer in mother     Family history of breast cancer in sister     Family history of colon cancer in mother     Hair loss     History of falling     History of pressure ulcer     HLD (hyperlipidemia)     HTN (hypertension)     Hypothyroidism, unspecified     Insomnia     Left foot drop     Mitral regurgitation     Moderate aortic regurgitation     DELANEY (obstructive sleep apnea)     DELANEY on CPAP     Osteoporosis     PAC (premature atrial contraction)     Pneumonia due to COVID-19 virus     Pulmonic valve regurgitation     PVC (premature ventricular contraction)     Sciatica     SVT (supraventricular tachycardia)     Tricuspid regurgitation     Vitamin D deficiency        Surgical History:   Estephania Herrera  has a past surgical history that includes  section (); Cataract extraction (10/27/2022); Fracture surgery (); Total abdominal hysterectomy w/ bilateral salpingoophorectomy (Bilateral, 2017); Tubal ligation ();  section (); Cataract extraction (2022); Hysteroscopy with dilation and curettage of uterus (2017); Ankle surgery; Eye surgery (10/27/2022); and Hysterectomy ().    Medications:   Estephania has a current medication list which includes the following prescription(s): atorvastatin, eliquis, levothyroxine, melatonin, and propranolol.    Allergies:   Review of patient's allergies indicates:  No Known Allergies     Imaging,  Patient has had an x-ray but results have yet been released    Prior Therapy: yes  Social History:  lives alone  Prior Level of Function: independent  Current Level of Function: modified independent      Outcome Measure:  Therapist reviewed FOTO scores for Estephania Herrera on 2024   FOTO documents entered into [employer] - see Media section.     Intake: Patient's Physical FS Primary Measure: 35 (goal is 48 by visit #12)  Intake:Risk Adjusted Statistical FOTO: 46  Intake:Limitation  Score: 65%  Category: Mobility  Intake: MDS  56.0% disability       Objective     Posture: decreased lumbar lordosis, valgus deformity right knee, left hip higher than right     Gait: trunk anterior to pelvis, rounded shoulders; antalgic with right lower extremity weight bearing       Palpation     Midline - mild ttp right lumber spine with moderate palpatory pressure     Left- mod ttp along paraspinals, moderate tenderness of facets, mild tenderness with unilateral PA's (lumbar spine);   L SI joint mild tenderness; with mild tenderness at L ASIS, no tenderness at greater trochanter and TFL, mild tenderness at IT band     Right-mild ttp along paraspinals, mod tenderness of facets, mild tenderness with unilateral PA's (lumbar spine);  R SI joint without tenderness; without tenderness at L ASIS,  without hip tenderness at greater trochanter and TFL, no tenderness at IT band        Dermatomes     Intact to light touch BLEs      Reflexes     Rogers: absent bilaterally  Right: patellar: 2+ and Achilles: 2+  Left: patellar: 2+ and Achilles: 2+      Myotomes     HIP FLX - Right 4+/5 without reproduction of R back pain, no hip pain, SI pain  Left- 4+/5 and without reproduction of back, hip pain, SI pain     HIP ABD - Right 4+/5 with reproduction of R back pain, mild hip pain, no SI pain  Left- 4/5 with reproduction of L back pain, mild hip pain, no SI pain     HIP ADD - Right 5/5 without reproduction of back pain, hip pain, or SI pain,  Left 5/5 without reproduction of back pain, hip pain, or SI pain     KNEE FLX - Right 4+/5 with pain, Left 4/5 with pain  KNEE EXT - Right 4+/5, Left 4/5 without pain  ANKLE DF - Right 5/5, Left 4+/5 without pain  ANKLE PF - Right 4+/5, Left 4+/5 without pain  GREAT TOE EXT - Right 4+/5, Left 4/5 without pain            Hip/SI Clearance     Right - FABIÁN; negative             Arsenio; negative              Log Roll; negative             SI distraction; negative             SI compression;  negative     Left - FABIÁN; negative           Arsenio; negative             Log Roll; negative            SI distraction; negative            SI compression; negative   AROM     Lumbar spine with mild limit flexion with reproduction of pain on R and L with repeated movement without leg pain; mild to mod limits with extension with pain R low back without leg pain; with mild to mod limits to R & L side bending with reproduction of pain on R with R side bending and L side  with L side bending; moderate limits with R rotation with reproduction of right back pain without leg pain; mod limits with L rotation with reproduction of L back pain but no leg pain     Hip-mild ROM with flexion, extension, abduction, adduction without pain on the left, with pain in the right low back with end range flexion(knee to chest); no rotation limits R hip or pain production; no rotation limits left hip or pain production     Ankle-mild ROM bilaterally without any pain      *no reproduction of leg pain during testing   Passive Accessory     -Thoracic: WFL     -Lumbar: WFL, however mild pain with right and left lateral PA's, moderate with extension @ L lumbar spine       Special Tests     Lumbar Quadrant Test; positive  Slump; negative R, positive left  SLR Right; negative back pain to 65 deg  SLR Left; positive back pain to 60 deg  90/90 Hamstring; negative R with leg pain, negative L (hamstrings tight bilaterally)   Piriformis Test; negative bilateral  Neural Tension: negative on the R, positive on the L          TREATMENT     Estephania received the treatments listed below:       Time Activities   Manual     TherAct     TherEx     Gait     Neuro Re-ed     Modalities     E-Stim     Dry Needling     Canalith Repositioning         Home Exercises and Patient Education Provided    Education provided:   - HEP, body mechanics     Written Home Exercises Provided: yes.  Exercises were reviewed and Estephania was able to demonstrate them prior to the end of  the session.  Estephania demonstrated good  understanding of the education provided.    See EMR under Media for exercises provided 11/11/2024.    Assessment   Estephania is a 74 y.o. female referred to outpatient Physical Therapy with a medical diagnosis of Chronic low back pain with left sided sciatica. Patient presents with low back pain with radiation into left leg, numbness and tingling into left leg, decreased low back mobility, decreased lower extremity ROM, general weakness with core weakness, decreased posture, decreased functional mobility and decreased functional capacity all resulting in poor quality of life . Patient will benefit from skilled outpatient Physical Therapy to address the deficits stated above, provide education, and to maximize patient's level of independence.     Patient prognosis is Good.     Plan of care discussed with patient: Yes  Patient's spiritual, cultural and educational needs considered and patient is agreeable to the plan of care and goals as stated below:     Anticipated Barriers for therapy: none    Goals:  Short Term Goals: 6 weeks   Patient will report at least 10% disability reduction on MDQ to indicate clinically significant functional improvement  Patient will report at least 10 point increase on initial FOTO score to indicate clinically significant functional         improvement   Patient will demonstrate ability to perform all ADL's and activities around the house/housework with pain no greater than 4/10  Patient will be able to grocery shop [employer] and others while reporting 4/10 pain or less  Patient will demonstrate increase with self perceived activity tolerance by 20%  Patient will demonstrate independence with HEP     Long Term Goals: 12 weeks   Patient will report at least 20% disability reduction on MDQ to indicate clinically significant functional improvement  Patient will report at least 20 point increase on initial FOTO score to indicate clinically significant  functional          improvement   Patient will demonstrate ability to perform all ADL's and activities around the house/housework with pain no greater than 2/10    Plan   Plan of care Certification: 11/11/2024 to 01/27/2025.    Outpatient Physical Therapy 2 times weekly for 12 weeks to include the following interventions: Cervical/Lumbar Traction, Electrical Stimulation IFC, Moist Heat/ Ice, Neuromuscular Re-ed, Dry Needling, Patient Education, Self Care, Therapeutic Activities, Therapeutic Exercise and Ultrasound.     Nilesh Cline, PT

## 2024-11-12 ENCOUNTER — HOSPITAL ENCOUNTER (OUTPATIENT)
Dept: RADIOLOGY | Facility: HOSPITAL | Age: 74
Discharge: HOME | End: 2024-11-12
Attending: FAMILY MEDICINE
Payer: MEDICARE

## 2024-11-12 DIAGNOSIS — Z12.31 BREAST CANCER SCREENING BY MAMMOGRAM: ICD-10-CM

## 2024-11-12 DIAGNOSIS — M54.42 CHRONIC BILATERAL LOW BACK PAIN WITH LEFT-SIDED SCIATICA: ICD-10-CM

## 2024-11-12 DIAGNOSIS — G89.29 CHRONIC BILATERAL LOW BACK PAIN WITH LEFT-SIDED SCIATICA: ICD-10-CM

## 2024-11-12 PROCEDURE — 72100 X-RAY EXAM L-S SPINE 2/3 VWS: CPT | Mod: TC

## 2024-11-12 PROCEDURE — 77063 BREAST TOMOSYNTHESIS BI: CPT | Mod: TC

## 2024-11-12 NOTE — PLAN OF CARE
"[facility]  Physical Therapy Initial Evaluation    Name: Estephania Herrera  Clinic Number: [id number]    Therapy Diagnosis: No diagnosis found.  Physician: Keith Briones DO    Physician Orders: Evaluate and treat  Medical Diagnosis from Referral: Chronic low back pain with left sided sciatica  Evaluation Date: 11/11/2024  Authorization Period Expiration: determined by insurance  Plan of Care Expiration: 01/27/2025  Visit # / Visits authorized: determined by insurance    Time In: 1105  Time Out: 1206  Total Appointment Time (timed & untimed codes): 61 minutes  Total Treatment time (time-based codes) separate from Evaluation: 0 minutes    Surgery: no  Orthopedic Precautions: none  Pertinent History: see medical chart    Subjective     Estephania reports: a history low back pain of ~30 yrs. 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 Reports extensive history of back pain that really worsened in 2021 after yulissa COVID. She was intubated on a ventilator for 2 weeks resulting in extreme weakness. She was completely dependent at that point and had 10/10 level back and leg pain and reports that she had left "drop foot.". During her stay she was given Gabapentin which really helped her leg pain. She went through inpatient therapy and then transitioned to Swing bed rehab where she eventually learned to walk again and began doing things for herself. For her back and leg pain she has seen a Chiropractor on 2 occasions and even tried Acupuncture all with limited results. She remained on the Gabapentin all during this time. She recently got off it because she felt like she had been on it too long and this coincides with her increase in pain. She describes her pain as mostly constant that is relieved with sitting, but not prolonged sitting. Her pain is central to left low back but there are times with high level pain on the right " "side. Pain will radiate intermittently into the left lower extremity. There is mostly constant numbness into the leg down to the foot and the leg feels "heavy. " Has to lift the leg with her hands to get into the car. When the numbness is not there the touch sensation is not normal, it is dull. She is a side sleeper and overall sleeps ok but has back pain when trying to turn over in bed. Her back is quite stiff when getting up in the mornings. Her symptoms and in particular her back pain is exacerbated with activities, especially any house work. She will sit and roll around on her rollator walker to do things otherwise she will get 10/10 pain. Trying to make her bed is really painful because she has to stand. Standing and walking are her greatest antagonists. Standing time or walking time is very limited and will have to sit down to recover and reduce the pain. Can sit for a short while and go again but is limited.    Reason for visit: chronic low back pain  Onset time and any changes: worse the last 3 months  Pain level and location: 5/10  Radiate proximal or distal: no leg pain today but there is some numbness  Describe pain: aching, sharp, dull, stiffness, and throbbing  Numbness/tingling: yes into the left leg  Agg factors: prolong activity but especially with standing or walking  Ease factors: rest, sitting or lying, meds, heat   Sleeping position: sides  Worse when: mornings when getting out of bed, prolong activity  Red flags: denies loss of bowel or bladder symptoms  Functional Limitations: limited with ADL's, limited functional capacity.  Goals: get rid of her pain and to be able to her housework and other ADL's  Prior therapy/intervention: Chiropractor, Accupuncture      Medical History:   Past Medical History:   Diagnosis Date    Acute respiratory failure due to COVID-19     Chronic atrial fibrillation     Chronic back pain     Edema of lower extremity     Endometrial mass     Essential tremor     Family " history of bladder cancer     brother    Family history of breast cancer in mother     Family history of breast cancer in sister     Family history of colon cancer in mother     Hair loss     History of falling     History of pressure ulcer     HLD (hyperlipidemia)     HTN (hypertension)     Hypothyroidism, unspecified     Insomnia     Left foot drop     Mitral regurgitation     Moderate aortic regurgitation     DELANEY (obstructive sleep apnea)     DELANEY on CPAP     Osteoporosis     PAC (premature atrial contraction)     Pneumonia due to COVID-19 virus     Pulmonic valve regurgitation     PVC (premature ventricular contraction)     Sciatica     SVT (supraventricular tachycardia)     Tricuspid regurgitation     Vitamin D deficiency        Surgical History:   Estephania Herrera  has a past surgical history that includes  section (); Cataract extraction (10/27/2022); Fracture surgery (); Total abdominal hysterectomy w/ bilateral salpingoophorectomy (Bilateral, 2017); Tubal ligation ();  section (); Cataract extraction (2022); Hysteroscopy with dilation and curettage of uterus (2017); Ankle surgery; Eye surgery (10/27/2022); and Hysterectomy ().    Medications:   Estephania has a current medication list which includes the following prescription(s): atorvastatin, eliquis, levothyroxine, melatonin, and propranolol.    Allergies:   Review of patient's allergies indicates:  No Known Allergies     Imaging,  Patient has had an x-ray but results have yet been released    Prior Therapy: yes  Social History:  lives alone  Prior Level of Function: independent  Current Level of Function: modified independent      Outcome Measure:  Therapist reviewed FOTO scores for Estephania Herrera on 2024   FOTO documents entered into [employer] - see Media section.     Intake: Patient's Physical FS Primary Measure: 35 (goal is 48 by visit #12)  Intake:Risk Adjusted Statistical FOTO: 46  Intake:Limitation  Score: 65%  Category: Mobility  Intake: MDS  56.0% disability       Objective     Posture: decreased lumbar lordosis, valgus deformity right knee, left hip higher than right     Gait: trunk anterior to pelvis, rounded shoulders; antalgic with right lower extremity weight bearing       Palpation     Midline - mild ttp right lumber spine with moderate palpatory pressure     Left- mod ttp along paraspinals, moderate tenderness of facets, mild tenderness with unilateral PA's (lumbar spine);   L SI joint mild tenderness; with mild tenderness at L ASIS, no tenderness at greater trochanter and TFL, mild tenderness at IT band     Right-mild ttp along paraspinals, mod tenderness of facets, mild tenderness with unilateral PA's (lumbar spine);  R SI joint without tenderness; without tenderness at L ASIS,  without hip tenderness at greater trochanter and TFL, no tenderness at IT band        Dermatomes     Intact to light touch BLEs      Reflexes     Rogers: absent bilaterally  Right: patellar: 2+ and Achilles: 2+  Left: patellar: 2+ and Achilles: 2+      Myotomes     HIP FLX - Right 4+/5 without reproduction of R back pain, no hip pain, SI pain  Left- 4+/5 and without reproduction of back, hip pain, SI pain     HIP ABD - Right 4+/5 with reproduction of R back pain, mild hip pain, no SI pain  Left- 4/5 with reproduction of L back pain, mild hip pain, no SI pain     HIP ADD - Right 5/5 without reproduction of back pain, hip pain, or SI pain,  Left 5/5 without reproduction of back pain, hip pain, or SI pain     KNEE FLX - Right 4+/5 with pain, Left 4/5 with pain  KNEE EXT - Right 4+/5, Left 4/5 without pain  ANKLE DF - Right 5/5, Left 4+/5 without pain  ANKLE PF - Right 4+/5, Left 4+/5 without pain  GREAT TOE EXT - Right 4+/5, Left 4/5 without pain            Hip/SI Clearance     Right - FABIÁN; negative             Arsenio; negative              Log Roll; negative             SI distraction; negative             SI compression;  negative     Left - FABIÁN; negative           Arsenio; negative             Log Roll; negative            SI distraction; negative            SI compression; negative   AROM     Lumbar spine with mild limit flexion with reproduction of pain on R and L with repeated movement without leg pain; mild to mod limits with extension with pain R low back without leg pain; with mild to mod limits to R & L side bending with reproduction of pain on R with R side bending and L side  with L side bending; moderate limits with R rotation with reproduction of right back pain without leg pain; mod limits with L rotation with reproduction of L back pain but no leg pain     Hip-mild ROM with flexion, extension, abduction, adduction without pain on the left, with pain in the right low back with end range flexion(knee to chest); no rotation limits R hip or pain production; no rotation limits left hip or pain production     Ankle-mild ROM bilaterally without any pain      *no reproduction of leg pain during testing   Passive Accessory     -Thoracic: WFL     -Lumbar: WFL, however mild pain with right and left lateral PA's, moderate with extension @ L lumbar spine       Special Tests     Lumbar Quadrant Test; positive  Slump; negative R, positive left  SLR Right; negative back pain to 65 deg  SLR Left; positive back pain to 60 deg  90/90 Hamstring; negative R with leg pain, negative L (hamstrings tight bilaterally)   Piriformis Test; negative bilateral  Neural Tension: negative on the R, positive on the L          TREATMENT     Estephania received the treatments listed below:       Time Activities   Manual     TherAct     TherEx     Gait     Neuro Re-ed     Modalities     E-Stim     Dry Needling     Canalith Repositioning         Home Exercises and Patient Education Provided    Education provided:   - HEP, body mechanics     Written Home Exercises Provided: yes.  Exercises were reviewed and Estephania was able to demonstrate them prior to the end of  the session.  Estephania demonstrated good  understanding of the education provided.    See EMR under Media for exercises provided 11/11/2024.    Assessment   Estephania is a 74 y.o. female referred to outpatient Physical Therapy with a medical diagnosis of Chronic low back pain with left sided sciatica. Patient presents with low back pain with radiation into left leg, numbness and tingling into left leg, decreased low back mobility, decreased lower extremity ROM, general weakness with core weakness, decreased posture, decreased functional mobility and decreased functional capacity all resulting in poor quality of life . Patient will benefit from skilled outpatient Physical Therapy to address the deficits stated above, provide education, and to maximize patient's level of independence.     Patient prognosis is Good.     Plan of care discussed with patient: Yes  Patient's spiritual, cultural and educational needs considered and patient is agreeable to the plan of care and goals as stated below:     Anticipated Barriers for therapy: none    Goals:  Short Term Goals: 6 weeks   Patient will report at least 10% disability reduction on MDQ to indicate clinically significant functional improvement  Patient will report at least 10 point increase on initial FOTO score to indicate clinically significant functional         improvement   Patient will demonstrate ability to perform all ADL's and activities around the house/housework with pain no greater than 4/10  Patient will be able to grocery shop [employer] and others while reporting 4/10 pain or less  Patient will demonstrate increase with self perceived activity tolerance by 20%  Patient will demonstrate independence with HEP     Long Term Goals: 12 weeks   Patient will report at least 20% disability reduction on MDQ to indicate clinically significant functional improvement  Patient will report at least 20 point increase on initial FOTO score to indicate clinically significant  functional          improvement   Patient will demonstrate ability to perform all ADL's and activities around the house/housework with pain no greater than 2/10    Plan   Plan of care Certification: 11/11/2024 to 01/27/2025.    Outpatient Physical Therapy 2 times weekly for 12 weeks to include the following interventions: Cervical/Lumbar Traction, Electrical Stimulation IFC, Moist Heat/ Ice, Neuromuscular Re-ed, Dry Needling, Patient Education, Self Care, Therapeutic Activities, Therapeutic Exercise and Ultrasound.     Nilesh Cline, PT

## 2024-11-18 ENCOUNTER — TELEPHONE (OUTPATIENT)
Dept: FAMILY MEDICINE | Facility: CLINIC | Age: 74
End: 2024-11-18
Payer: MEDICARE

## 2024-11-18 ENCOUNTER — CLINICAL SUPPORT (OUTPATIENT)
Dept: REHABILITATION | Facility: HOSPITAL | Age: 74
End: 2024-11-18
Payer: MEDICARE

## 2024-11-18 DIAGNOSIS — G89.29 CHRONIC BILATERAL LOW BACK PAIN WITH LEFT-SIDED SCIATICA: Primary | ICD-10-CM

## 2024-11-18 DIAGNOSIS — Z74.09 IMPAIRED FUNCTIONAL MOBILITY, BALANCE, GAIT, AND ENDURANCE: ICD-10-CM

## 2024-11-18 DIAGNOSIS — M54.42 CHRONIC BILATERAL LOW BACK PAIN WITH LEFT-SIDED SCIATICA: Primary | ICD-10-CM

## 2024-11-18 PROCEDURE — 97110 THERAPEUTIC EXERCISES: CPT

## 2024-11-18 NOTE — PROGRESS NOTES
Physical Therapy Treatment Note     Name: Estephania eHrrera  Clinic Number: [id number]    Therapy Diagnosis: Chronic low back pain, rotatory levoscoliosis   Physician: Keith Briones DO    Visit Date: 11/18/2024    Physician Orders: Evaluate and treat  Medical Diagnosis from Referral: Chronic low back pain with left sided sciatica  Evaluation Date: 11/11/2024  Authorization Period Expiration: determined by insurance  Plan of Care Expiration: 01/27/2025  Visit # / Visits authorized: determined by insurance    Time In: 1109  Time Out: 1214  Total Billable Time: 55 minutes     Surgery: no  Orthopedic Precautions: none  Pertinent History: see medical chart    Subjective     Patient reports: continued low back pain that she rates 7-8/10. No leg pain today but does at times only on the left side. Walking is tough for her as this increases her back pain.    Response to previous treatment: good    Pain: 7-8/10  Location: bilateral back      Outcome Measure:  Therapist reviewed FOTO scores for Estephania Herrera on 11/11/2024   FOTO documents entered into [employer] - see Media section.     Intake: Patient's Physical FS Primary Measure: 35 (goal is 48 by visit #12)  Intake:Risk Adjusted Statistical FOTO: 46  Intake:Limitation Score: 65%  Category: Mobility  Intake: MDS  56.0% disability    Objective     XR LUMBAR SPINE AP AND LATERAL     CPT: 97604     CLINICAL HISTORY:  Lumbago with sciatica, left side     FINDINGS:  There are 5 non rib-bearing vertebral bodies with a rotatory levoscoliosis with maximum curvature of the level of L3 vertebral bodies are normal height, position and alignment with minimal anterolisthesis of L4 on L5 degenerative changes with tiny marginal osteophytes are identified as well as degenerative changes of the posterior elements at L3-L4 L4-L5 and L5-S1     Impression:     Rotatory scoliotic changes.     Degenerative changes.     Minimal anterolisthesis of L4 on L5      Estephania received the following  treatment:     Time Activities   Manual     TherAct     TherEx 55 Core and pelvic stabilization exercises   Gait     Neuro Re-ed     Modalities 10 Moist heat to the lumbar spine pre exercise   E-Stim     Dry Needling     Canalith Repositioning           Home Exercises Provided and Patient Education Provided     Education provided:   - HEP, body mechanics     Written Home Exercises Provided: yes.  Exercises were reviewed and Estephania was able to demonstrate them prior to the end of the session.  Estephania demonstrated good  understanding of the education provided.     See EMR under Media for exercises provided 11/11/2024.    Assessment     Estephania is a 74 y.o. female referred to outpatient Physical Therapy with a medical diagnosis of Chronic low back pain with left sided sciatica. Patient presents with low back pain with radiation into left leg, numbness and tingling into left leg, decreased low back mobility, decreased lower extremity ROM, general weakness with core weakness, decreased posture, decreased functional mobility and decreased functional capacity all resulting in poor quality of life.    Salvador was put through targeted exercises for core and pelvic stability and she tolerated it pretty well. No increase in her back during exercises and there was no leg pain during our session. Will continue with the plan of care.    Patient prognosis is Good.      Anticipated barriers to physical therapy: obesity    Goals: Estephania Is progressing well towards her goals.    Short Term Goals: 6 weeks   Patient will report at least 10% disability reduction on MDQ to indicate clinically significant functional improvement  Patient will report at least 10 point increase on initial FOTO score to indicate clinically significant functional         improvement   Patient will demonstrate ability to perform all ADL's and activities around the house/housework with pain no greater than 4/10  Patient will be able to grocery shop [employer] and  others while reporting 4/10 pain or less  Patient will demonstrate increase with self perceived activity tolerance by 20%  Patient will demonstrate independence with HEP     Long Term Goals: 12 weeks   Patient will report at least 20% disability reduction on MDQ to indicate clinically significant functional improvement  Patient will report at least 20 point increase on initial FOTO score to indicate clinically significant functional          improvement   Patient will demonstrate ability to perform all ADL's and activities around the house/housework with pain no greater than 2/10       Plan     Plan of care Certification: 11/11/2024 to 01/27/2025.     Outpatient Physical Therapy 2 times weekly for 12 weeks to include the following interventions: Cervical/Lumbar Traction, Electrical Stimulation IFC, Moist Heat/ Ice, Neuromuscular Re-ed, Dry Needling, Patient Education, Self Care, Therapeutic Activities, Therapeutic Exercise and Ultrasound.     Nilesh Cline, PT

## 2024-11-18 NOTE — TELEPHONE ENCOUNTER
----- Message from Keith Briones DO sent at 11/15/2024  1:05 PM CST -----  I have reviewed the imaging results. Some scoliotic changes, degenerative changes, and minor anterolisthesis of L4 on L5.

## 2024-11-20 ENCOUNTER — TELEPHONE (OUTPATIENT)
Dept: FAMILY MEDICINE | Facility: CLINIC | Age: 74
End: 2024-11-20
Payer: MEDICARE

## 2024-11-20 ENCOUNTER — CLINICAL SUPPORT (OUTPATIENT)
Dept: REHABILITATION | Facility: HOSPITAL | Age: 74
End: 2024-11-20
Payer: MEDICARE

## 2024-11-20 DIAGNOSIS — M54.42 CHRONIC BILATERAL LOW BACK PAIN WITH LEFT-SIDED SCIATICA: Primary | ICD-10-CM

## 2024-11-20 DIAGNOSIS — G89.29 CHRONIC BILATERAL LOW BACK PAIN WITH LEFT-SIDED SCIATICA: Primary | ICD-10-CM

## 2024-11-20 DIAGNOSIS — Z74.09 IMPAIRED FUNCTIONAL MOBILITY, BALANCE, GAIT, AND ENDURANCE: ICD-10-CM

## 2024-11-20 PROCEDURE — 97110 THERAPEUTIC EXERCISES: CPT

## 2024-11-20 NOTE — TELEPHONE ENCOUNTER
----- Message from Keith Briones DO sent at 11/20/2024  9:57 AM CST -----  Normal MMG. Repeat in 1 year.

## 2024-11-20 NOTE — PROGRESS NOTES
Physical Therapy Treatment Note     Name: Estephania Herrera  Clinic Number: [id number]    Therapy Diagnosis: Chronic low back pain, rotatory levoscoliosis   Physician: Keith Briones DO    Visit Date: 11/20/2024    Physician Orders: Evaluate and treat  Medical Diagnosis from Referral: Chronic low back pain with left sided sciatica  Evaluation Date: 11/11/2024  Authorization Period Expiration: determined by insurance  Plan of Care Expiration: 01/27/2025  Visit # / Visits authorized: visit #2 of 24    Time In: 1108  Time Out: 1213  Total Billable Time: 55 minutes     Surgery: no  Orthopedic Precautions: none  Pertinent History: see medical chart    Subjective     Patient reports: back pain not too bad today. Denies any leg pain. Walking remains difficult and she is limited to short distances.    Response to previous treatment: good    Pain: 7/10  Location: bilateral back      Outcome Measure:  Therapist reviewed FOTO scores for Estephania Herrera on 11/11/2024   FOTO documents entered into [employer] - see Media section.     Intake: Patient's Physical FS Primary Measure: 35 (goal is 48 by visit #12)  Intake:Risk Adjusted Statistical FOTO: 46  Intake:Limitation Score: 65%  Category: Mobility  Intake: MDS  56.0% disability    Objective     XR LUMBAR SPINE AP AND LATERAL     CPT: 16046     CLINICAL HISTORY:  Lumbago with sciatica, left side     FINDINGS:  There are 5 non rib-bearing vertebral bodies with a rotatory levoscoliosis with maximum curvature of the level of L3 vertebral bodies are normal height, position and alignment with minimal anterolisthesis of L4 on L5 degenerative changes with tiny marginal osteophytes are identified as well as degenerative changes of the posterior elements at L3-L4 L4-L5 and L5-S1     Impression:     Rotatory scoliotic changes.     Degenerative changes.     Minimal anterolisthesis of L4 on L5      Estephania received the following treatment:     Time Activities   Manual     TherAct     TherEx 55 Core  and pelvic stabilization exercises   Gait     Neuro Re-ed     Modalities 10 Moist heat to the lumbar spine pre exercise   E-Stim     Dry Needling     Canalith Repositioning           Home Exercises Provided and Patient Education Provided     Education provided:   - HEP, body mechanics     Written Home Exercises Provided: yes.  Exercises were reviewed and Estephania was able to demonstrate them prior to the end of the session.  Estephania demonstrated good  understanding of the education provided.     See EMR under Media for exercises provided 11/11/2024.    Assessment     Estephania is a 74 y.o. female referred to outpatient Physical Therapy with a medical diagnosis of Chronic low back pain with left sided sciatica. Patient presents with low back pain with radiation into left leg, numbness and tingling into left leg, decreased low back mobility, decreased lower extremity ROM, general weakness with core weakness, decreased posture, decreased functional mobility and decreased functional capacity all resulting in poor quality of life.    Salvador was put through targeted exercises for core and pelvic stability and she tolerated it pretty well. No increase in her back during exercises but we did have her do a 2 minute walk and towards the end the back was starting to hurt. There was no leg pain during our session. Will continue with the plan of care.    Patient prognosis is Good.      Anticipated barriers to physical therapy: obesity    Goals: Estephania Is progressing well towards her goals.    Short Term Goals: 6 weeks   Patient will report at least 10% disability reduction on MDQ to indicate clinically significant functional improvement  Patient will report at least 10 point increase on initial FOTO score to indicate clinically significant functional         improvement   Patient will demonstrate ability to perform all ADL's and activities around the house/housework with pain no greater than 4/10  Patient will be able to grocery shop  [employer] and others while reporting 4/10 pain or less  Patient will demonstrate increase with self perceived activity tolerance by 20%  Patient will demonstrate independence with HEP     Long Term Goals: 12 weeks   Patient will report at least 20% disability reduction on MDQ to indicate clinically significant functional improvement  Patient will report at least 20 point increase on initial FOTO score to indicate clinically significant functional          improvement   Patient will demonstrate ability to perform all ADL's and activities around the house/housework with pain no greater than 2/10       Plan     Plan of care Certification: 11/11/2024 to 01/27/2025.     Outpatient Physical Therapy 2 times weekly for 12 weeks to include the following interventions: Cervical/Lumbar Traction, Electrical Stimulation IFC, Moist Heat/ Ice, Neuromuscular Re-ed, Dry Needling, Patient Education, Self Care, Therapeutic Activities, Therapeutic Exercise and Ultrasound.     Nilesh Cline, PT

## 2024-11-25 ENCOUNTER — CLINICAL SUPPORT (OUTPATIENT)
Dept: REHABILITATION | Facility: HOSPITAL | Age: 74
End: 2024-11-25
Payer: MEDICARE

## 2024-11-25 DIAGNOSIS — Z74.09 IMPAIRED FUNCTIONAL MOBILITY, BALANCE, GAIT, AND ENDURANCE: ICD-10-CM

## 2024-11-25 DIAGNOSIS — M54.42 CHRONIC BILATERAL LOW BACK PAIN WITH LEFT-SIDED SCIATICA: Primary | ICD-10-CM

## 2024-11-25 DIAGNOSIS — G89.29 CHRONIC BILATERAL LOW BACK PAIN WITH LEFT-SIDED SCIATICA: Primary | ICD-10-CM

## 2024-11-25 PROCEDURE — 97110 THERAPEUTIC EXERCISES: CPT

## 2024-11-25 NOTE — PROGRESS NOTES
Physical Therapy Treatment Note     Name: Estephania Herrera  Clinic Number: [id number]    Therapy Diagnosis: Chronic low back pain, rotatory levoscoliosis   Physician: Keith Briones DO    Visit Date: 11/25/2024    Physician Orders: Evaluate and treat  Medical Diagnosis from Referral: Chronic low back pain with left sided sciatica  Evaluation Date: 11/11/2024  Authorization Period Expiration: determined by insurance  Plan of Care Expiration: 01/27/2025  Visit # / Visits authorized: visit #3 of 24    Time In: 1108  Time Out: 1213  Total Billable Time: 55 minutes     Surgery: no  Orthopedic Precautions: none  Pertinent History: see medical chart    Subjective     Patient reports: back pain is a little better. Denies any leg pain. Walking remains difficult and she is limited to short distances.    Response to previous treatment: good    Pain: 6/10  Location: bilateral back      Outcome Measure:  Therapist reviewed FOTO scores for Estephania Herrera on 11/11/2024   FOTO documents entered into [employer] - see Media section.     Intake: Patient's Physical FS Primary Measure: 35 (goal is 48 by visit #12)  Intake:Risk Adjusted Statistical FOTO: 46  Intake:Limitation Score: 65%  Category: Mobility  Intake: MDS  56.0% disability    Objective        Estephania received the following treatment:     Time Activities   Manual     TherAct     TherEx 55 Core and pelvic stabilization exercises   Gait     Neuro Re-ed     Modalities 10 Moist heat to the lumbar spine pre exercise   E-Stim     Dry Needling     Canalith Repositioning           Home Exercises Provided and Patient Education Provided     Education provided:   - HEP, body mechanics     Written Home Exercises Provided: yes.  Exercises were reviewed and Estephania was able to demonstrate them prior to the end of the session.  Estephania demonstrated good  understanding of the education provided.     See EMR under Media for exercises provided 11/11/2024.    Assessment     Estephania is a 74 y.o. female  referred to outpatient Physical Therapy with a medical diagnosis of Chronic low back pain with left sided sciatica. Patient presents with low back pain with radiation into left leg, numbness and tingling into left leg, decreased low back mobility, decreased lower extremity ROM, general weakness with core weakness, decreased posture, decreased functional mobility and decreased functional capacity all resulting in poor quality of life.    Salvador was put through targeted exercises for core and pelvic stability and she tolerated it pretty well. No increase in her back during exercises but have some on her increased time to 3 minute walk. She is deconditioned. There was no leg pain during our session. Will continue with the plan of care.    Patient prognosis is Good.      Anticipated barriers to physical therapy: obesity    Goals: Estephania Is progressing well towards her goals.    Short Term Goals: 6 weeks   Patient will report at least 10% disability reduction on MDQ to indicate clinically significant functional improvement  Patient will report at least 10 point increase on initial FOTO score to indicate clinically significant functional         improvement   Patient will demonstrate ability to perform all ADL's and activities around the house/housework with pain no greater than 4/10  Patient will be able to grocery shop [employer] and others while reporting 4/10 pain or less  Patient will demonstrate increase with self perceived activity tolerance by 20%  Patient will demonstrate independence with HEP     Long Term Goals: 12 weeks   Patient will report at least 20% disability reduction on MDQ to indicate clinically significant functional improvement  Patient will report at least 20 point increase on initial FOTO score to indicate clinically significant functional          improvement   Patient will demonstrate ability to perform all ADL's and activities around the house/housework with pain no greater than 2/10       Plan      Plan of care Certification: 11/11/2024 to 01/27/2025.     Outpatient Physical Therapy 2 times weekly for 12 weeks to include the following interventions: Cervical/Lumbar Traction, Electrical Stimulation IFC, Moist Heat/ Ice, Neuromuscular Re-ed, Dry Needling, Patient Education, Self Care, Therapeutic Activities, Therapeutic Exercise and Ultrasound.     Nilesh Cline, PT

## 2024-11-27 ENCOUNTER — CLINICAL SUPPORT (OUTPATIENT)
Dept: REHABILITATION | Facility: HOSPITAL | Age: 74
End: 2024-11-27
Payer: MEDICARE

## 2024-11-27 DIAGNOSIS — G89.29 CHRONIC BILATERAL LOW BACK PAIN WITH LEFT-SIDED SCIATICA: Primary | ICD-10-CM

## 2024-11-27 DIAGNOSIS — M54.42 CHRONIC BILATERAL LOW BACK PAIN WITH LEFT-SIDED SCIATICA: Primary | ICD-10-CM

## 2024-11-27 DIAGNOSIS — Z74.09 IMPAIRED FUNCTIONAL MOBILITY, BALANCE, GAIT, AND ENDURANCE: ICD-10-CM

## 2024-11-27 PROCEDURE — 97110 THERAPEUTIC EXERCISES: CPT

## 2024-11-27 NOTE — PROGRESS NOTES
Physical Therapy Treatment Note     Name: Estephania Herrera  Clinic Number: [id number]    Therapy Diagnosis: Chronic low back pain, rotatory levoscoliosis   Physician: Keith Briones DO    Visit Date: 11/27/2024    Physician Orders: Evaluate and treat  Medical Diagnosis from Referral: Chronic low back pain with left sided sciatica  Evaluation Date: 11/11/2024  Authorization Period Expiration: determined by insurance  Plan of Care Expiration: 01/27/2025  Visit # / Visits authorized: visit #4 of 24    Time In: 1117  Time Out: 1200  Total Billable Time: 43 minutes     Surgery: no  Orthopedic Precautions: none  Pertinent History: see medical chart    Subjective     Patient reports: back pain is a little better without any real leg pain. Walking remains difficult and she is limited to short distances due to back pain and fatigue.     Response to previous treatment: good    Pain: 6/10  Location: bilateral back      Outcome Measure:  Therapist reviewed FOTO scores for Estephania Herrera on 11/11/2024   FOTO documents entered into [employer] - see Media section.     Intake: Patient's Physical FS Primary Measure: 35 (goal is 48 by visit #12)  Intake:Risk Adjusted Statistical FOTO: 46  Intake:Limitation Score: 65%  Category: Mobility  Intake: MDS  56.0% disability    Objective        Estephania received the following treatment:     Time Activities   Manual     TherAct     TherEx 43 Core and pelvic stabilization exercises; functional endurance exercise   Gait     Neuro Re-ed     Modalities Not done today Moist heat to the lumbar spine pre exercise   E-Stim     Dry Needling     Canalith Repositioning           Home Exercises Provided and Patient Education Provided     Education provided:   - HEP, body mechanics     Written Home Exercises Provided: yes.  Exercises were reviewed and Estephania was able to demonstrate them prior to the end of the session.  Estephania demonstrated good  understanding of the education provided.     See EMR under Media for  exercises provided 11/11/2024.    Assessment     Estephania is a 74 y.o. female referred to outpatient Physical Therapy with a medical diagnosis of Chronic low back pain with left sided sciatica. Patient presents with low back pain with radiation into left leg, numbness and tingling into left leg, decreased low back mobility, decreased lower extremity ROM, general weakness with core weakness, decreased posture, decreased functional mobility and decreased functional capacity all resulting in poor quality of life.    Salvador was put through targeted exercises for core and pelvic stability and she tolerated it pretty well. She did experience some pain into the right hip and leg when usually the symptoms are on the left side. Lower back was sore post exercise as well. She is deconditioned and was only able to complete 2.5 mins for her walk. Will continue with the plan of care.    Patient prognosis is Good.      Anticipated barriers to physical therapy: obesity    Goals: Estephania Is progressing well towards her goals.    Short Term Goals: 6 weeks   Patient will report at least 10% disability reduction on MDQ to indicate clinically significant functional improvement  Patient will report at least 10 point increase on initial FOTO score to indicate clinically significant functional         improvement   Patient will demonstrate ability to perform all ADL's and activities around the house/housework with pain no greater than 4/10  Patient will be able to grocery shop [employer] and others while reporting 4/10 pain or less  Patient will demonstrate increase with self perceived activity tolerance by 20%  Patient will demonstrate independence with HEP     Long Term Goals: 12 weeks   Patient will report at least 20% disability reduction on MDQ to indicate clinically significant functional improvement  Patient will report at least 20 point increase on initial FOTO score to indicate clinically significant functional          improvement    Patient will demonstrate ability to perform all ADL's and activities around the house/housework with pain no greater than 2/10       Plan     Plan of care Certification: 11/11/2024 to 01/27/2025.     Outpatient Physical Therapy 2 times weekly for 12 weeks to include the following interventions: Cervical/Lumbar Traction, Electrical Stimulation IFC, Moist Heat/ Ice, Neuromuscular Re-ed, Dry Needling, Patient Education, Self Care, Therapeutic Activities, Therapeutic Exercise and Ultrasound.     Nilesh Cline, PT

## 2024-12-02 ENCOUNTER — CLINICAL SUPPORT (OUTPATIENT)
Dept: REHABILITATION | Facility: HOSPITAL | Age: 74
End: 2024-12-02
Payer: MEDICARE

## 2024-12-02 DIAGNOSIS — Z74.09 IMPAIRED FUNCTIONAL MOBILITY, BALANCE, GAIT, AND ENDURANCE: ICD-10-CM

## 2024-12-02 DIAGNOSIS — G89.29 CHRONIC BILATERAL LOW BACK PAIN WITH LEFT-SIDED SCIATICA: Primary | ICD-10-CM

## 2024-12-02 DIAGNOSIS — M54.42 CHRONIC BILATERAL LOW BACK PAIN WITH LEFT-SIDED SCIATICA: Primary | ICD-10-CM

## 2024-12-02 PROCEDURE — 97110 THERAPEUTIC EXERCISES: CPT

## 2024-12-02 NOTE — PROGRESS NOTES
Physical Therapy Treatment Note     Name: Estephania Herrera  Clinic Number: [id number]    Therapy Diagnosis: Chronic low back pain, rotatory levoscoliosis   Physician: Keith Briones DO    Visit Date: 12/2/2024    Physician Orders: Evaluate and treat  Medical Diagnosis from Referral: Chronic low back pain with left sided sciatica  Evaluation Date: 11/11/2024  Authorization Period Expiration: determined by insurance  Plan of Care Expiration: 01/27/2025  Visit # / Visits authorized: visit #5 of 24    Time In: 1118  Time Out: 1221  Total Billable Time: 53 minutes     Surgery: no  Orthopedic Precautions: none  Pertinent History: see medical chart    Subjective     Patient reports: has been having some left leg pain that wakes her up. Walking remains difficult and she is limited to short distances due to back pain and fatigue.     Response to previous treatment: good    Pain: 5/10  Location: bilateral back      Outcome Measure:  Therapist reviewed FOTO scores for Estephania Herrera on 11/11/2024   FOTO documents entered into [employer] - see Media section.     Intake: Patient's Physical FS Primary Measure: 35 (goal is 48 by visit #12)  Intake:Risk Adjusted Statistical FOTO: 46  Intake:Limitation Score: 65%  Category: Mobility  Intake: MDS  56.0% disability    Objective        Estephania received the following treatment:     Time Activities   Manual     TherAct     TherEx 53 Core and pelvic stabilization exercises; functional endurance exercise   Gait     Neuro Re-ed     Modalities Not done today Moist heat to the lumbar spine pre exercise   E-Stim     Dry Needling     Canalith Repositioning           Home Exercises Provided and Patient Education Provided     Education provided:   - HEP, body mechanics     Written Home Exercises Provided: yes.  Exercises were reviewed and Estephania was able to demonstrate them prior to the end of the session.  Estephania demonstrated good  understanding of the education provided.     See EMR under Media for  exercises provided 11/11/2024.    Assessment     Estephania is a 74 y.o. female referred to outpatient Physical Therapy with a medical diagnosis of Chronic low back pain with left sided sciatica. Patient presents with low back pain with radiation into left leg, numbness and tingling into left leg, decreased low back mobility, decreased lower extremity ROM, general weakness with core weakness, decreased posture, decreased functional mobility and decreased functional capacity all resulting in poor quality of life.    Salvador was put through targeted exercises for core and pelvic stability and she tolerated it pretty well. She did experience some pain into the right hip and leg when usually the symptoms are on the left side. Lower back was sore post exercise as well. She is deconditioned and was only able to complete 3 mins for her walk. Had no leg pain when leaving today. Will continue with the plan of care.    Patient prognosis is Good.      Anticipated barriers to physical therapy: obesity    Goals: Estephania Is progressing well towards her goals.    Short Term Goals: 6 weeks   Patient will report at least 10% disability reduction on MDQ to indicate clinically significant functional improvement  Patient will report at least 10 point increase on initial FOTO score to indicate clinically significant functional         improvement   Patient will demonstrate ability to perform all ADL's and activities around the house/housework with pain no greater than 4/10  Patient will be able to grocery shop [employer] and others while reporting 4/10 pain or less  Patient will demonstrate increase with self perceived activity tolerance by 20%  Patient will demonstrate independence with HEP     Long Term Goals: 12 weeks   Patient will report at least 20% disability reduction on MDQ to indicate clinically significant functional improvement  Patient will report at least 20 point increase on initial FOTO score to indicate clinically significant  functional          improvement   Patient will demonstrate ability to perform all ADL's and activities around the house/housework with pain no greater than 2/10       Plan     Plan of care Certification: 11/11/2024 to 01/27/2025.     Outpatient Physical Therapy 2 times weekly for 12 weeks to include the following interventions: Cervical/Lumbar Traction, Electrical Stimulation IFC, Moist Heat/ Ice, Neuromuscular Re-ed, Dry Needling, Patient Education, Self Care, Therapeutic Activities, Therapeutic Exercise and Ultrasound.     Nilesh Cline, PT

## 2024-12-04 ENCOUNTER — CLINICAL SUPPORT (OUTPATIENT)
Dept: REHABILITATION | Facility: HOSPITAL | Age: 74
End: 2024-12-04
Payer: MEDICARE

## 2024-12-04 DIAGNOSIS — Z74.09 IMPAIRED FUNCTIONAL MOBILITY, BALANCE, GAIT, AND ENDURANCE: ICD-10-CM

## 2024-12-04 DIAGNOSIS — G89.29 CHRONIC BILATERAL LOW BACK PAIN WITH LEFT-SIDED SCIATICA: Primary | ICD-10-CM

## 2024-12-04 DIAGNOSIS — M54.42 CHRONIC BILATERAL LOW BACK PAIN WITH LEFT-SIDED SCIATICA: Primary | ICD-10-CM

## 2024-12-04 PROCEDURE — 97110 THERAPEUTIC EXERCISES: CPT

## 2024-12-05 NOTE — PROGRESS NOTES
Physical Therapy Treatment Note     Name: Estephania Herrera  Clinic Number: [id number]    Therapy Diagnosis: Chronic low back pain, rotatory levoscoliosis   Physician: Keith Briones DO    Visit Date: 12/4/2024    Physician Orders: Evaluate and treat  Medical Diagnosis from Referral: Chronic low back pain with left sided sciatica  Evaluation Date: 11/11/2024  Authorization Period Expiration: determined by insurance  Plan of Care Expiration: 01/27/2025  Visit # / Visits authorized: visit #6 of 24    Time In: 1114  Time Out: 1217  Total Billable Time: 53 minutes     Surgery: no  Orthopedic Precautions: none  Pertinent History: see medical chart    Subjective     Patient reports: not too bad today. No real leg pain. Walking remains difficult and she is limited to short distances due to back pain and fatigue but states that she is able to walk farther.    Response to previous treatment: good    Pain: 4/10  Location: bilateral back      Outcome Measure:  Therapist reviewed FOTO scores for Estephania Herrera on 11/11/2024   FOTO documents entered into [employer] - see Media section.     Intake: Patient's Physical FS Primary Measure: 35 (goal is 48 by visit #12)  Intake:Risk Adjusted Statistical FOTO: 46  Intake:Limitation Score: 65%  Category: Mobility  Intake: MDS  56.0% disability    12/04/2024: Patient's Physical FS Primary Measure: 45 (goal is 44 by visit #12)  Intake:Risk Adjusted Statistical FOTO: 46  12/04/2024: Limitation Score: 55%  12/04/2024: Category: Mobility  12/04/2024: MDS  42.0% disability    Objective        Estephania received the following treatment:     Time Activities   Manual     TherAct     TherEx 53 Core and pelvic stabilization exercises; functional endurance exercise   Gait     Neuro Re-ed     Modalities Not done today Moist heat to the lumbar spine pre exercise   E-Stim     Dry Needling     Canalith Repositioning           Home Exercises Provided and Patient Education Provided     Education provided:   - HEP,  body mechanics     Written Home Exercises Provided: yes.  Exercises were reviewed and Estephania was able to demonstrate them prior to the end of the session.  Estephania demonstrated good  understanding of the education provided.     See EMR under Media for exercises provided 11/11/2024.    Assessment     Estephania is a 74 y.o. female referred to outpatient Physical Therapy with a medical diagnosis of Chronic low back pain with left sided sciatica. Patient presents with low back pain with radiation into left leg, numbness and tingling into left leg, decreased low back mobility, decreased lower extremity ROM, general weakness with core weakness, decreased posture, decreased functional mobility and decreased functional capacity all resulting in poor quality of life.    Overall Estephania has made some progress and this is evident by her score on the functional outcome tool going from 65% limitation to 55%. She is now able to walk a little farther before her symptoms come on. She was again put through targeted exercises for core and pelvic stability and she tolerated it pretty well. She did experience some pain into the right hip with trunk rotations. Lower back was sore post exercise as well. She is deconditioned and was only able to complete 3 mins for her walk but this is getting a little easier for her. Had no leg pain when leaving today's session. Will continue with the plan of care.    Patient prognosis is Good.      Anticipated barriers to physical therapy: obesity    Goals: Estephania Is progressing well towards her goals.    Short Term Goals: 6 weeks   Patient will report at least 10% disability reduction on MDQ to indicate clinically significant functional improvement  Patient will report at least 10 point increase on initial FOTO score to indicate clinically significant functional         improvement   Patient will demonstrate ability to perform all ADL's and activities around the house/housework with pain no greater than  4/10  Patient will be able to grocery shop [employer] and others while reporting 4/10 pain or less  Patient will demonstrate increase with self perceived activity tolerance by 20%  Patient will demonstrate independence with HEP     Long Term Goals: 12 weeks   Patient will report at least 20% disability reduction on MDQ to indicate clinically significant functional improvement  Patient will report at least 20 point increase on initial FOTO score to indicate clinically significant functional          improvement   Patient will demonstrate ability to perform all ADL's and activities around the house/housework with pain no greater than 2/10       Plan     Plan of care Certification: 11/11/2024 to 01/27/2025.     Outpatient Physical Therapy 2 times weekly for 12 weeks to include the following interventions: Cervical/Lumbar Traction, Electrical Stimulation IFC, Moist Heat/ Ice, Neuromuscular Re-ed, Dry Needling, Patient Education, Self Care, Therapeutic Activities, Therapeutic Exercise and Ultrasound.     Nilesh Cline, PT

## 2024-12-09 ENCOUNTER — CLINICAL SUPPORT (OUTPATIENT)
Dept: REHABILITATION | Facility: HOSPITAL | Age: 74
End: 2024-12-09
Payer: MEDICARE

## 2024-12-09 DIAGNOSIS — M54.42 CHRONIC BILATERAL LOW BACK PAIN WITH LEFT-SIDED SCIATICA: Primary | ICD-10-CM

## 2024-12-09 DIAGNOSIS — G89.29 CHRONIC BILATERAL LOW BACK PAIN WITH LEFT-SIDED SCIATICA: Primary | ICD-10-CM

## 2024-12-09 DIAGNOSIS — Z74.09 IMPAIRED FUNCTIONAL MOBILITY, BALANCE, GAIT, AND ENDURANCE: ICD-10-CM

## 2024-12-09 PROCEDURE — 97110 THERAPEUTIC EXERCISES: CPT

## 2024-12-09 NOTE — PROGRESS NOTES
Physical Therapy Treatment Note     Name: Estephania Herrera  Clinic Number: [id number]    Therapy Diagnosis: Chronic low back pain, rotatory levoscoliosis   Physician: Keith Briones DO    Visit Date: 12/9/2024    Physician Orders: Evaluate and treat  Medical Diagnosis from Referral: Chronic low back pain with left sided sciatica  Evaluation Date: 11/11/2024  Authorization Period Expiration: determined by insurance  Plan of Care Expiration: 01/27/2025  Visit # / Visits authorized: visit #7 of 24    Time In: 1105  Time Out:1205  Total Billable Time: 53 minutes     Surgery: no  Orthopedic Precautions: none  Pertinent History: see medical chart    Subjective     Patient reports: not too bad today. Overall states that she is about the same. No real leg pain. Walking remains difficult and she is limited to short distances due to back pain and fatigue but states that she is able to walk farther.    Response to previous treatment: good    Pain: 4/10  Location: bilateral back      Outcome Measure:  Therapist reviewed FOTO scores for Estephania Herrera on 11/11/2024   FOTO documents entered into [employer] - see Media section.     Intake: Patient's Physical FS Primary Measure: 35 (goal is 48 by visit #12)  Intake:Risk Adjusted Statistical FOTO: 46  Intake:Limitation Score: 65%  Category: Mobility  Intake: MDS  56.0% disability    12/04/2024: Patient's Physical FS Primary Measure: 45 (goal is 44 by visit #12)  Intake:Risk Adjusted Statistical FOTO: 46  12/04/2024: Limitation Score: 55%  12/04/2024: Category: Mobility  12/04/2024: MDS  42.0% disability    Objective        Estephania received the following treatment:     Time Activities   Manual     TherAct     TherEx 53 Core and pelvic stabilization exercises; functional endurance exercise   Gait     Neuro Re-ed     Modalities Not done today Moist heat to the lumbar spine pre exercise   E-Stim     Dry Needling     Canalith Repositioning           Home Exercises Provided and Patient Education  Provided     Education provided:   - HEP, body mechanics     Written Home Exercises Provided: yes.  Exercises were reviewed and Estephania was able to demonstrate them prior to the end of the session.  Estephania demonstrated good  understanding of the education provided.     See EMR under Media for exercises provided 11/11/2024.    Assessment     Estephania is a 74 y.o. female referred to outpatient Physical Therapy with a medical diagnosis of Chronic low back pain with left sided sciatica. Patient presents with low back pain with radiation into left leg, numbness and tingling into left leg, decreased low back mobility, decreased lower extremity ROM, general weakness with core weakness, decreased posture, decreased functional mobility and decreased functional capacity all resulting in poor quality of life.    Overall Estephania has made some progress and this is evident by her score on the functional outcome tool going from 65% limitation to 55% despite stating she is about the same. She is now able to walk a little farther before her symptoms come on. She was again put through targeted exercises for core and pelvic stability and she tolerated it pretty well. She did experience some pain into the right hip with trunk rotations. Lower back was sore post exercise as well. She is deconditioned and was only able to complete 3 mins for her walk but this is getting a little easier for her. Today we had her walk with ankle weights. Had no leg pain when leaving today's session. Will continue with the plan of care.    Patient prognosis is Good.      Anticipated barriers to physical therapy: obesity    Goals: Estephania Is progressing well towards her goals.    Short Term Goals: 6 weeks   Patient will report at least 10% disability reduction on MDQ to indicate clinically significant functional improvement  Patient will report at least 10 point increase on initial FOTO score to indicate clinically significant functional         improvement    Patient will demonstrate ability to perform all ADL's and activities around the house/housework with pain no greater than 4/10  Patient will be able to grocery shop [employer] and others while reporting 4/10 pain or less  Patient will demonstrate increase with self perceived activity tolerance by 20%  Patient will demonstrate independence with HEP     Long Term Goals: 12 weeks   Patient will report at least 20% disability reduction on MDQ to indicate clinically significant functional improvement  Patient will report at least 20 point increase on initial FOTO score to indicate clinically significant functional          improvement   Patient will demonstrate ability to perform all ADL's and activities around the house/housework with pain no greater than 2/10       Plan     Plan of care Certification: 11/11/2024 to 01/27/2025.     Outpatient Physical Therapy 2 times weekly for 12 weeks to include the following interventions: Cervical/Lumbar Traction, Electrical Stimulation IFC, Moist Heat/ Ice, Neuromuscular Re-ed, Dry Needling, Patient Education, Self Care, Therapeutic Activities, Therapeutic Exercise and Ultrasound.     Nilesh Cline, PT

## 2025-01-24 ENCOUNTER — DOCUMENTATION ONLY (OUTPATIENT)
Dept: REHABILITATION | Facility: HOSPITAL | Age: 75
End: 2025-01-24
Payer: MEDICARE

## 2025-01-24 NOTE — PROGRESS NOTES
Marion called to self discharge. She completed 6 visits of physical therapy. Based on the functional outcome tool she made some but limited progress. Her score on the functional outcome tool improved some going from 65% limitation to 55%. Plans to return to her doctor for reassessment.      Short Term Goals: 6 weeks   Patient will report at least 10% disability reduction on MDQ to indicate clinically significant functional improvement-Goal met  Patient will report at least 10 point increase on initial FOTO score to indicate clinically significant functional improvement-Goal met    3. Patient will demonstrate ability to perform all ADL's and activities around the            house/housework with pain no greater than 4/10-not met  4.  Patient will be able to grocery shop [employer] and others while reporting 4/10 pain or less-not met  5.  Patient will demonstrate increase with self perceived activity tolerance by 20%-not met-not met  6.  Patient will demonstrate independence with HEP-Goal met     Long Term Goals: 12 weeks   Patient will report at least 20% disability reduction on MDQ to indicate clinically significant functional improvement  Patient will report at least 20 point increase on initial FOTO score to indicate clinically significant functional          improvement   Patient will demonstrate ability to perform all ADL's and activities around the house/housework with pain no greater than 2/10

## 2025-02-27 ENCOUNTER — OFFICE VISIT (OUTPATIENT)
Dept: FAMILY MEDICINE | Facility: CLINIC | Age: 75
End: 2025-02-27
Payer: MEDICARE

## 2025-02-27 VITALS
WEIGHT: 291 LBS | SYSTOLIC BLOOD PRESSURE: 126 MMHG | HEIGHT: 64 IN | HEART RATE: 61 BPM | RESPIRATION RATE: 20 BRPM | DIASTOLIC BLOOD PRESSURE: 72 MMHG | TEMPERATURE: 98 F | OXYGEN SATURATION: 96 % | BODY MASS INDEX: 49.68 KG/M2

## 2025-02-27 DIAGNOSIS — I48.0 PAROXYSMAL ATRIAL FIBRILLATION: ICD-10-CM

## 2025-02-27 DIAGNOSIS — E66.01 CLASS 3 SEVERE OBESITY WITH SERIOUS COMORBIDITY AND BODY MASS INDEX (BMI) OF 45.0 TO 49.9 IN ADULT, UNSPECIFIED OBESITY TYPE: ICD-10-CM

## 2025-02-27 DIAGNOSIS — E66.813 CLASS 3 SEVERE OBESITY WITH SERIOUS COMORBIDITY AND BODY MASS INDEX (BMI) OF 45.0 TO 49.9 IN ADULT, UNSPECIFIED OBESITY TYPE: ICD-10-CM

## 2025-02-27 DIAGNOSIS — F03.90 DEMENTIA, UNSPECIFIED DEMENTIA SEVERITY, UNSPECIFIED DEMENTIA TYPE, UNSPECIFIED WHETHER BEHAVIORAL, PSYCHOTIC, OR MOOD DISTURBANCE OR ANXIETY: ICD-10-CM

## 2025-02-27 PROBLEM — J44.89 OTHER SPECIFIED CHRONIC OBSTRUCTIVE PULMONARY DISEASE: Status: RESOLVED | Noted: 2024-11-05 | Resolved: 2025-02-27

## 2025-02-27 RX ORDER — MULTIVITAMIN
1 TABLET ORAL DAILY
COMMUNITY

## 2025-02-27 NOTE — PROGRESS NOTES
Patient ID: [id number]     Chief Complaint: Follow-up (Chronic medical conditions. )        HPI:     Estephania Herrera is a 75 y.o. female here today for Follow-up (Chronic medical conditions. ) No other complaints today.     History of Present Illness               -------------------------------------    Acute respiratory failure due to COVID-19    Chronic atrial fibrillation    Chronic back pain    Edema of lower extremity    Endometrial mass    Essential tremor    Family history of bladder cancer    brother    Family history of breast cancer in mother    Family history of breast cancer in sister    Family history of colon cancer in mother    Hair loss    History of falling    History of pressure ulcer    HLD (hyperlipidemia)    HTN (hypertension)    Hypothyroidism, unspecified    Insomnia    Left foot drop    Mitral regurgitation    Moderate aortic regurgitation    DELANEY (obstructive sleep apnea)    DELANEY on CPAP    Osteoporosis    PAC (premature atrial contraction)    Pneumonia due to COVID-19 virus    Pulmonic valve regurgitation    PVC (premature ventricular contraction)    Sciatica    SVT (supraventricular tachycardia)    Tricuspid regurgitation    Vitamin D deficiency        Past Surgical History:   Procedure Laterality Date    ANKLE SURGERY      CATARACT EXTRACTION  10/27/2022    Dr Avis Pastor    CATARACT EXTRACTION  2022     SECTION  1971    Dr Albina Rendon     SECTION  1973    Dr Jeremias Dixon    EYE SURGERY  10/27/2022    FRACTURE SURGERY  1965    HYSTERECTOMY  2017    HYSTEROSCOPY WITH DILATION AND CURETTAGE OF UTERUS  2017    Dr Galdino Del Angel    TOTAL ABDOMINAL HYSTERECTOMY W/ BILATERAL SALPINGOOPHORECTOMY Bilateral 2017    Dr Galdino Del Angel    TUBAL LIGATION  1980       Review of patient's allergies indicates:  No Known Allergies    Outpatient Medications Marked as Taking for the 25 encounter (Office Visit) with Keith Briones,    Medication Sig Dispense Refill     atorvastatin (LIPITOR) 10 MG tablet Take 1 tablet (10 mg total) by mouth once daily. 90 tablet 3    ELIQUIS 5 mg Tab Take 5 mg by mouth 2 (two) times daily.      levothyroxine (SYNTHROID) 75 MCG tablet TAKE 1 TABLET (75 MCG TOTAL) BY MOUTH BEFORE BREAKFAST. 90 tablet 3    melatonin 10 mg Tab Take 1 tablet by mouth nightly as needed for Insomnia. Takes 5 mg      multivitamin (THERAGRAN) per tablet Take 1 tablet by mouth once daily.      propranoloL (INDERAL) 40 MG tablet Take 1 tablet (40 mg total) by mouth 2 (two) times daily. 180 tablet 2       Social History[1]     Family History   Problem Relation Name Age of Onset    Breast cancer Mother Kristin GARCIA Morgan         s/p R mastectomy    Coronary artery disease Mother Kristin Mora         CABGx4 , PTCA w/ stents x2    Hypertension Mother Kristin Mora     Colon cancer Mother Kristin Mora         s/p colostomy/w colon resection    Bradycardia Mother Kristin Mora     Hepatitis Mother Kristin Mora         Chronic Hepatitis    Atrial fibrillation Mother Kristin Mora     Pacemaker/defibrilator Mother Kristin Mora     Cancer Mother Kristin Mora         Breast    Heart disease Mother Kristin Mora     Arthritis Father Ulysse Morgan     Hearing loss Father Ulysse Morgan     Coronary artery disease Father Ulysse Morgan         PTCA w/ stent    Hypertension Father Ulysse Morgan     Ulcers Father Ulysse Morgan 93        Perforated gastric ulcer - Cause of death    Aortic aneurysm Father Ulysse Morgan         AAA    Heart disease Father Ulysse Morgan     Breast cancer Sister Kristin Mora     Cancer Sister Kristin Mora         Breast    Bladder Cancer Brother Hang Mora     Coronary artery disease Brother Hang Mora         PTCA    Hypertension Brother Hang Mora     Heart attack Brother Hang Mora     Cancer Brother Hang Mora         Bladder    Heart disease Brother Hang  "Morgan         Patient Care Team:  Keith Briones DO as PCP - General (Family Medicine)  Carl Bustos MD as Consulting Physician (Otolaryngology)  Steve Tamez MD as Consulting Physician (Cardiology)  Avis Pastor MD (Ophthalmology)  Thien Bettencourt MD as Consulting Physician (Neurology)  Paul Kramer FNP as Nurse Practitioner (Cardiology)     Subjective:     ROS    See HPI for details  All Other ROS: Negative except as stated in HPI.       Objective:     /72 (BP Location: Left arm, Patient Position: Sitting)   Pulse 61   Temp 97.8 °F (36.6 °C)   Resp 20   Ht 5' 4" (1.626 m)   Wt 132 kg (291 lb)   SpO2 96%   BMI 49.95 kg/m²     Physical Exam  Vitals reviewed.   Constitutional:       General: She is not in acute distress.     Appearance: Normal appearance. She is obese.   Cardiovascular:      Rate and Rhythm: Normal rate and regular rhythm.      Heart sounds: No murmur heard.     No friction rub. No gallop.   Pulmonary:      Effort: No respiratory distress.      Breath sounds: No wheezing, rhonchi or rales.   Musculoskeletal:         General: No swelling, tenderness or deformity.      Right lower leg: No edema.      Left lower leg: No edema.   Skin:     General: Skin is warm and dry.      Findings: No lesion or rash.   Neurological:      General: No focal deficit present.      Mental Status: She is alert.   Psychiatric:         Mood and Affect: Mood normal.         Assessment/Plan:     1. Paroxysmal atrial fibrillation  -currently well controlled on propranolol 40mg BID and eliquis 5mg BID.     2. Dementia, unspecified dementia severity, unspecified dementia type, unspecified whether behavioral, psychotic, or mood disturbance or anxiety  -stable, patient notes that her memory seems to have improved since last visit.     3. Class 3 severe obesity with serious comorbidity and body mass index (BMI) of 45.0 to 49.9 in adult, unspecified obesity type  -continue diet and exercise as " tolerated        Assessment & Plan               This note was generated with the assistance of ambient listening technology. Verbal consent was obtained by the patient and accompanying visitor(s) for the recording of patient appointment to facilitate this note. I attest to having reviewed and edited the generated note for accuracy, though some syntax or spelling errors may persist. Please contact the author of this note for any clarification.     Follow up:     Follow up in about 9 months (around 11/27/2025) for Medicare Wellness. In addition to their scheduled follow up, the patient has also been instructed to follow up on as needed basis.                [1]   Social History  Socioeconomic History    Marital status:    Tobacco Use    Smoking status: Former     Current packs/day: 1.00     Average packs/day: 1 pack/day for 5.0 years (5.0 ttl pk-yrs)     Types: Cigarettes    Smokeless tobacco: Never   Substance and Sexual Activity    Alcohol use: Never    Drug use: Never    Sexual activity: Not Currently     Birth control/protection: See Surgical Hx, Post-menopausal     Social Drivers of Health     Financial Resource Strain: Low Risk  (2/26/2025)    Overall Financial Resource Strain (CARDIA)     Difficulty of Paying Living Expenses: Not very hard   Food Insecurity: No Food Insecurity (2/26/2025)    Hunger Vital Sign     Worried About Running Out of Food in the Last Year: Never true     Ran Out of Food in the Last Year: Never true   Transportation Needs: No Transportation Needs (2/26/2025)    PRAPARE - Transportation     Lack of Transportation (Medical): No     Lack of Transportation (Non-Medical): No   Physical Activity: Insufficiently Active (2/26/2025)    Exercise Vital Sign     Days of Exercise per Week: 2 days     Minutes of Exercise per Session: 20 min   Stress: Stress Concern Present (2/26/2025)    [employer] - Occupational Stress Questionnaire     Feeling of Stress : To  some extent   Housing Stability: Low Risk  (2/26/2025)    Housing Stability Vital Sign     Unable to Pay for Housing in the Last Year: No     Number of Times Moved in the Last Year: 1     Homeless in the Last Year: No

## 2025-06-08 DIAGNOSIS — E03.1 CONGENITAL HYPOTHYROIDISM WITHOUT GOITER: ICD-10-CM

## 2025-06-09 RX ORDER — LEVOTHYROXINE SODIUM 75 UG/1
75 TABLET ORAL
Qty: 90 TABLET | Refills: 3 | Status: SHIPPED | OUTPATIENT
Start: 2025-06-09

## 2025-06-11 ENCOUNTER — TELEPHONE (OUTPATIENT)
Dept: FAMILY MEDICINE | Facility: CLINIC | Age: 75
End: 2025-06-11
Payer: MEDICARE

## 2025-06-11 DIAGNOSIS — I48.0 PAROXYSMAL ATRIAL FIBRILLATION: Primary | ICD-10-CM

## 2025-06-11 DIAGNOSIS — I48.0 PAROXYSMAL ATRIAL FIBRILLATION: ICD-10-CM

## 2025-06-11 RX ORDER — APIXABAN 5 MG/1
5 TABLET, FILM COATED ORAL 2 TIMES DAILY
Qty: 60 TABLET | Refills: 0 | Status: SHIPPED | OUTPATIENT
Start: 2025-06-11

## 2025-06-11 RX ORDER — APIXABAN 5 MG/1
5 TABLET, FILM COATED ORAL 2 TIMES DAILY
Qty: 60 TABLET | Refills: 0 | Status: SHIPPED | OUTPATIENT
Start: 2025-06-11 | End: 2025-06-11 | Stop reason: SDUPTHER

## 2025-06-11 NOTE — TELEPHONE ENCOUNTER
Copied from CRM #[id number]. Topic: General Inquiry - Patient Advice  >> Jun 11, 2025  4:02 PM Rimma wrote:  Who Called: Estephania Herrera    Caller is requesting assistance/information from provider's office.    Symptoms (please be specific):    How long has patient had these symptoms:    List of preferred pharmacies on file (remove unneeded):[facility]/pharmacy #[id number] - [place], [place] - [address]   Phone: [phone]  Fax: [phone]        If different, enter pharmacy into here including location and phone number:       Preferred Method of Contact: Phone Call  Patient's Preferred Phone Number on File: [phone]   Best Call Back Number, if different:  Additional Information: Patient states ELIQUIS 5 mg Tab was sent into wrong pharmacy please send into [facility].

## 2025-07-06 DIAGNOSIS — G25.0 ESSENTIAL TREMOR: ICD-10-CM

## 2025-07-07 RX ORDER — PROPRANOLOL HYDROCHLORIDE 40 MG/1
40 TABLET ORAL 2 TIMES DAILY
Qty: 180 TABLET | Refills: 3 | Status: SHIPPED | OUTPATIENT
Start: 2025-07-07

## 2025-07-13 DIAGNOSIS — I48.0 PAROXYSMAL ATRIAL FIBRILLATION: ICD-10-CM

## 2025-07-14 RX ORDER — APIXABAN 5 MG/1
5 TABLET, FILM COATED ORAL 2 TIMES DAILY
Qty: 60 TABLET | Refills: 0 | Status: SHIPPED | OUTPATIENT
Start: 2025-07-14

## 2025-07-29 ENCOUNTER — OFFICE VISIT (OUTPATIENT)
Dept: NEUROLOGY | Facility: CLINIC | Age: 75
End: 2025-07-29
Payer: MEDICARE

## 2025-07-29 VITALS
BODY MASS INDEX: 46.48 KG/M2 | SYSTOLIC BLOOD PRESSURE: 130 MMHG | DIASTOLIC BLOOD PRESSURE: 80 MMHG | HEIGHT: 65 IN | WEIGHT: 279 LBS

## 2025-07-29 DIAGNOSIS — G25.0 ESSENTIAL TREMOR: Primary | ICD-10-CM

## 2025-07-29 DIAGNOSIS — F03.90 DEMENTIA, UNSPECIFIED DEMENTIA SEVERITY, UNSPECIFIED DEMENTIA TYPE, UNSPECIFIED WHETHER BEHAVIORAL, PSYCHOTIC, OR MOOD DISTURBANCE OR ANXIETY: ICD-10-CM

## 2025-07-29 PROCEDURE — 3288F FALL RISK ASSESSMENT DOCD: CPT | Mod: CPTII,S$GLB,, | Performed by: PSYCHIATRY & NEUROLOGY

## 2025-07-29 PROCEDURE — 99213 OFFICE O/P EST LOW 20 MIN: CPT | Mod: S$GLB,,, | Performed by: PSYCHIATRY & NEUROLOGY

## 2025-07-29 PROCEDURE — 1101F PT FALLS ASSESS-DOCD LE1/YR: CPT | Mod: CPTII,S$GLB,, | Performed by: PSYCHIATRY & NEUROLOGY

## 2025-07-29 PROCEDURE — 1126F AMNT PAIN NOTED NONE PRSNT: CPT | Mod: CPTII,S$GLB,, | Performed by: PSYCHIATRY & NEUROLOGY

## 2025-07-29 PROCEDURE — 3075F SYST BP GE 130 - 139MM HG: CPT | Mod: CPTII,S$GLB,, | Performed by: PSYCHIATRY & NEUROLOGY

## 2025-07-29 PROCEDURE — 1159F MED LIST DOCD IN RCRD: CPT | Mod: CPTII,S$GLB,, | Performed by: PSYCHIATRY & NEUROLOGY

## 2025-07-29 PROCEDURE — 3079F DIAST BP 80-89 MM HG: CPT | Mod: CPTII,S$GLB,, | Performed by: PSYCHIATRY & NEUROLOGY

## 2025-07-29 PROCEDURE — 99999 PR PBB SHADOW E&M-EST. PATIENT-LVL III: CPT | Mod: PBBFAC,,, | Performed by: PSYCHIATRY & NEUROLOGY

## 2025-07-29 PROCEDURE — 3008F BODY MASS INDEX DOCD: CPT | Mod: CPTII,S$GLB,, | Performed by: PSYCHIATRY & NEUROLOGY

## 2025-07-29 RX ORDER — PROPRANOLOL HYDROCHLORIDE 40 MG/1
40 TABLET ORAL 2 TIMES DAILY
Qty: 180 TABLET | Refills: 3 | Status: SHIPPED | OUTPATIENT
Start: 2025-07-29

## 2025-07-29 NOTE — PROGRESS NOTES
Subjective     Patient ID: Estephania Herrera is a 75 y.o. female.    Chief Complaint: Tremors    HPI  Tremor greatly improved, continued on Inderal   Reports no history with memory    Review of Systems  The remainder of the 14 system ROS is noncontributory or negative unless mentioned/reviewed above.       Objective     Physical Exam  Mental Status: Alert and oriented x3. Language is fluent with good comprehension.    Cranial Nerve: Pupils are equal, round, and reactive to light. Visual fields are intact to confrontation. Ocular movements are intact. Face is symmetric at rest and with activation with intact sensation throughout. Hearing intact to finger rub bilaterally. Muscles of tongue and palate activate symmetrically. No dysarthria. Strength is full in sternocleidomastoid and trapezius bilaterally.    Motor: Muscle bulk and tone are normal. Strength is 5/5 in all four extremities both proximally and distally. Intact fine motor movements bilaterally. There is no pronator drift or satelliting on arm roll.    Sensory: Sensation is intact to light touch, pinprick, vibration, and proprioception throughout. Romberg is negative.    Reflexes: 2+ and symmetric at the biceps, triceps, brachioradialis, patella, and Achilles bilaterally. Plantar response is flexor bilaterally.    Coordination: No dysmetria on finger-nose-finger or heel-knee-shin. Normal rapid alternating movements. Fast finger tapping with normal amplitude and speed.    Gait: Narrow based with normal stride length and good arm swing bilaterally. Able to walk on heels, toes, and in tandem.       Assessment and Plan     1. Essential tremor  -     propranoloL (INDERAL) 40 MG tablet; Take 1 tablet (40 mg total) by mouth 2 (two) times daily.  Dispense: 180 tablet; Refill: 3    2. Dementia, unspecified dementia severity, unspecified dementia type, unspecified whether behavioral, psychotic, or mood disturbance or anxiety      Continue propranolol daily    Follow up in  about 1 year (around 7/29/2026).    Rishi Nguyen DO  [facility] PGY-3

## 2025-08-17 DIAGNOSIS — I48.0 PAROXYSMAL ATRIAL FIBRILLATION: ICD-10-CM

## 2025-08-18 RX ORDER — APIXABAN 5 MG/1
5 TABLET, FILM COATED ORAL 2 TIMES DAILY
Qty: 60 TABLET | Refills: 0 | Status: SHIPPED | OUTPATIENT
Start: 2025-08-18